# Patient Record
Sex: MALE | Race: WHITE | NOT HISPANIC OR LATINO | Employment: FULL TIME | URBAN - METROPOLITAN AREA
[De-identification: names, ages, dates, MRNs, and addresses within clinical notes are randomized per-mention and may not be internally consistent; named-entity substitution may affect disease eponyms.]

---

## 2018-08-14 ENCOUNTER — HOSPITAL ENCOUNTER (OUTPATIENT)
Facility: CLINIC | Age: 48
Discharge: HOME | End: 2018-08-14
Attending: ORTHOPAEDIC SURGERY | Admitting: ORTHOPAEDIC SURGERY
Payer: OTHER MISCELLANEOUS

## 2018-08-14 ENCOUNTER — ANESTHESIA (OUTPATIENT)
Dept: SURGERY | Facility: CLINIC | Age: 48
End: 2018-08-14
Payer: OTHER MISCELLANEOUS

## 2018-08-14 ENCOUNTER — ANESTHESIA EVENT (OUTPATIENT)
Dept: SURGERY | Facility: CLINIC | Age: 48
End: 2018-08-14
Payer: OTHER MISCELLANEOUS

## 2018-08-14 VITALS
OXYGEN SATURATION: 93 % | HEIGHT: 68 IN | SYSTOLIC BLOOD PRESSURE: 159 MMHG | DIASTOLIC BLOOD PRESSURE: 95 MMHG | WEIGHT: 281.7 LBS | TEMPERATURE: 96.8 F | RESPIRATION RATE: 16 BRPM | BODY MASS INDEX: 42.69 KG/M2

## 2018-08-14 DIAGNOSIS — M75.111 INCOMPLETE TEAR OF RIGHT ROTATOR CUFF: Primary | ICD-10-CM

## 2018-08-14 LAB
CREAT SERPL-MCNC: 0.62 MG/DL (ref 0.66–1.25)
GFR SERPL CREATININE-BSD FRML MDRD: >90 ML/MIN/1.7M2
GLUCOSE BLDC GLUCOMTR-MCNC: 202 MG/DL (ref 70–99)
GLUCOSE BLDC GLUCOMTR-MCNC: 236 MG/DL (ref 70–99)
INTERPRETATION ECG - MUSE: NORMAL
POTASSIUM SERPL-SCNC: 3.9 MMOL/L (ref 3.4–5.3)

## 2018-08-14 PROCEDURE — 71000012 ZZH RECOVERY PHASE 1 LEVEL 1 FIRST HR: Performed by: ORTHOPAEDIC SURGERY

## 2018-08-14 PROCEDURE — C1713 ANCHOR/SCREW BN/BN,TIS/BN: HCPCS | Performed by: ORTHOPAEDIC SURGERY

## 2018-08-14 PROCEDURE — 71000027 ZZH RECOVERY PHASE 2 EACH 15 MINS: Performed by: ORTHOPAEDIC SURGERY

## 2018-08-14 PROCEDURE — 36000093 ZZH SURGERY LEVEL 4 1ST 30 MIN: Performed by: ORTHOPAEDIC SURGERY

## 2018-08-14 PROCEDURE — 82962 GLUCOSE BLOOD TEST: CPT

## 2018-08-14 PROCEDURE — 25000566 ZZH SEVOFLURANE, EA 15 MIN: Performed by: ORTHOPAEDIC SURGERY

## 2018-08-14 PROCEDURE — 25000131 ZZH RX MED GY IP 250 OP 636 PS 637: Performed by: ANESTHESIOLOGY

## 2018-08-14 PROCEDURE — 84132 ASSAY OF SERUM POTASSIUM: CPT | Performed by: ANESTHESIOLOGY

## 2018-08-14 PROCEDURE — 40000171 ZZH STATISTIC PRE-PROCEDURE ASSESSMENT III: Performed by: ORTHOPAEDIC SURGERY

## 2018-08-14 PROCEDURE — 27210794 ZZH OR GENERAL SUPPLY STERILE: Performed by: ORTHOPAEDIC SURGERY

## 2018-08-14 PROCEDURE — 36415 COLL VENOUS BLD VENIPUNCTURE: CPT | Performed by: ANESTHESIOLOGY

## 2018-08-14 PROCEDURE — 25000128 H RX IP 250 OP 636: Performed by: ANESTHESIOLOGY

## 2018-08-14 PROCEDURE — 25800025 ZZH RX 258: Performed by: ORTHOPAEDIC SURGERY

## 2018-08-14 PROCEDURE — 25000128 H RX IP 250 OP 636: Performed by: PHYSICIAN ASSISTANT

## 2018-08-14 PROCEDURE — 37000009 ZZH ANESTHESIA TECHNICAL FEE, EACH ADDTL 15 MIN: Performed by: ORTHOPAEDIC SURGERY

## 2018-08-14 PROCEDURE — 36000063 ZZH SURGERY LEVEL 4 EA 15 ADDTL MIN: Performed by: ORTHOPAEDIC SURGERY

## 2018-08-14 PROCEDURE — 25000128 H RX IP 250 OP 636: Performed by: NURSE ANESTHETIST, CERTIFIED REGISTERED

## 2018-08-14 PROCEDURE — 93010 ELECTROCARDIOGRAM REPORT: CPT | Performed by: INTERNAL MEDICINE

## 2018-08-14 PROCEDURE — 25000128 H RX IP 250 OP 636: Performed by: ORTHOPAEDIC SURGERY

## 2018-08-14 PROCEDURE — 37000008 ZZH ANESTHESIA TECHNICAL FEE, 1ST 30 MIN: Performed by: ORTHOPAEDIC SURGERY

## 2018-08-14 PROCEDURE — 82565 ASSAY OF CREATININE: CPT | Performed by: ANESTHESIOLOGY

## 2018-08-14 PROCEDURE — 25000125 ZZHC RX 250: Performed by: NURSE ANESTHETIST, CERTIFIED REGISTERED

## 2018-08-14 RX ORDER — METOPROLOL TARTRATE 1 MG/ML
1-2 INJECTION, SOLUTION INTRAVENOUS EVERY 5 MIN PRN
Status: DISCONTINUED | OUTPATIENT
Start: 2018-08-14 | End: 2018-08-14 | Stop reason: HOSPADM

## 2018-08-14 RX ORDER — CEFAZOLIN SODIUM 2 G/100ML
2 INJECTION, SOLUTION INTRAVENOUS
Status: DISCONTINUED | OUTPATIENT
Start: 2018-08-14 | End: 2018-08-14 | Stop reason: ALTCHOICE

## 2018-08-14 RX ORDER — ALBUTEROL SULFATE 0.83 MG/ML
2.5 SOLUTION RESPIRATORY (INHALATION) EVERY 4 HOURS PRN
Status: DISCONTINUED | OUTPATIENT
Start: 2018-08-14 | End: 2018-08-14 | Stop reason: HOSPADM

## 2018-08-14 RX ORDER — ONDANSETRON 4 MG/1
4 TABLET, ORALLY DISINTEGRATING ORAL
Status: DISCONTINUED | OUTPATIENT
Start: 2018-08-14 | End: 2018-08-14 | Stop reason: HOSPADM

## 2018-08-14 RX ORDER — ONDANSETRON 4 MG/1
4 TABLET, ORALLY DISINTEGRATING ORAL EVERY 30 MIN PRN
Status: DISCONTINUED | OUTPATIENT
Start: 2018-08-14 | End: 2018-08-14 | Stop reason: HOSPADM

## 2018-08-14 RX ORDER — NALOXONE HYDROCHLORIDE 0.4 MG/ML
.1-.4 INJECTION, SOLUTION INTRAMUSCULAR; INTRAVENOUS; SUBCUTANEOUS
Status: DISCONTINUED | OUTPATIENT
Start: 2018-08-14 | End: 2018-08-14 | Stop reason: HOSPADM

## 2018-08-14 RX ORDER — FENTANYL CITRATE 50 UG/ML
25-50 INJECTION, SOLUTION INTRAMUSCULAR; INTRAVENOUS
Status: DISCONTINUED | OUTPATIENT
Start: 2018-08-14 | End: 2018-08-14 | Stop reason: HOSPADM

## 2018-08-14 RX ORDER — HYDROMORPHONE HYDROCHLORIDE 1 MG/ML
.3-.5 INJECTION, SOLUTION INTRAMUSCULAR; INTRAVENOUS; SUBCUTANEOUS EVERY 10 MIN PRN
Status: DISCONTINUED | OUTPATIENT
Start: 2018-08-14 | End: 2018-08-14 | Stop reason: HOSPADM

## 2018-08-14 RX ORDER — OXYCODONE HYDROCHLORIDE 5 MG/1
5 TABLET ORAL EVERY 4 HOURS PRN
Status: DISCONTINUED | OUTPATIENT
Start: 2018-08-14 | End: 2018-08-14 | Stop reason: HOSPADM

## 2018-08-14 RX ORDER — OXYCODONE HYDROCHLORIDE 5 MG/1
10 TABLET ORAL
Status: DISCONTINUED | OUTPATIENT
Start: 2018-08-14 | End: 2018-08-14 | Stop reason: HOSPADM

## 2018-08-14 RX ORDER — ONDANSETRON 2 MG/ML
4 INJECTION INTRAMUSCULAR; INTRAVENOUS EVERY 30 MIN PRN
Status: DISCONTINUED | OUTPATIENT
Start: 2018-08-14 | End: 2018-08-14 | Stop reason: HOSPADM

## 2018-08-14 RX ORDER — LIDOCAINE HYDROCHLORIDE 10 MG/ML
INJECTION, SOLUTION INFILTRATION; PERINEURAL PRN
Status: DISCONTINUED | OUTPATIENT
Start: 2018-08-14 | End: 2018-08-14

## 2018-08-14 RX ORDER — SODIUM CHLORIDE, SODIUM LACTATE, POTASSIUM CHLORIDE, CALCIUM CHLORIDE 600; 310; 30; 20 MG/100ML; MG/100ML; MG/100ML; MG/100ML
INJECTION, SOLUTION INTRAVENOUS CONTINUOUS
Status: DISCONTINUED | OUTPATIENT
Start: 2018-08-14 | End: 2018-08-14 | Stop reason: HOSPADM

## 2018-08-14 RX ORDER — CELECOXIB 200 MG/1
200 CAPSULE ORAL
Status: DISCONTINUED | OUTPATIENT
Start: 2018-08-14 | End: 2018-08-14 | Stop reason: HOSPADM

## 2018-08-14 RX ORDER — FENTANYL CITRATE 50 UG/ML
INJECTION, SOLUTION INTRAMUSCULAR; INTRAVENOUS PRN
Status: DISCONTINUED | OUTPATIENT
Start: 2018-08-14 | End: 2018-08-14

## 2018-08-14 RX ORDER — NICOTINE POLACRILEX 4 MG
15-30 LOZENGE BUCCAL
Status: DISCONTINUED | OUTPATIENT
Start: 2018-08-14 | End: 2018-08-14 | Stop reason: HOSPADM

## 2018-08-14 RX ORDER — LIDOCAINE 40 MG/G
CREAM TOPICAL
Status: DISCONTINUED | OUTPATIENT
Start: 2018-08-14 | End: 2018-08-14 | Stop reason: HOSPADM

## 2018-08-14 RX ORDER — CEFAZOLIN SODIUM 1 G/3ML
1 INJECTION, POWDER, FOR SOLUTION INTRAMUSCULAR; INTRAVENOUS SEE ADMIN INSTRUCTIONS
Status: DISCONTINUED | OUTPATIENT
Start: 2018-08-14 | End: 2018-08-14 | Stop reason: HOSPADM

## 2018-08-14 RX ORDER — CEFAZOLIN SODIUM 1 G/50ML
3 SOLUTION INTRAVENOUS
Status: DISCONTINUED | OUTPATIENT
Start: 2018-08-14 | End: 2018-08-14

## 2018-08-14 RX ORDER — ONDANSETRON 2 MG/ML
INJECTION INTRAMUSCULAR; INTRAVENOUS PRN
Status: DISCONTINUED | OUTPATIENT
Start: 2018-08-14 | End: 2018-08-14

## 2018-08-14 RX ORDER — HYDROXYZINE HYDROCHLORIDE 25 MG/1
25 TABLET, FILM COATED ORAL
Status: DISCONTINUED | OUTPATIENT
Start: 2018-08-14 | End: 2018-08-14 | Stop reason: HOSPADM

## 2018-08-14 RX ORDER — HYDROXYZINE HYDROCHLORIDE 10 MG/1
10 TABLET, FILM COATED ORAL EVERY 6 HOURS PRN
Qty: 30 TABLET | Refills: 0 | Status: SHIPPED | OUTPATIENT
Start: 2018-08-14 | End: 2022-12-05

## 2018-08-14 RX ORDER — OXYCODONE HYDROCHLORIDE 5 MG/1
5-10 TABLET ORAL
Qty: 30 TABLET | Refills: 0 | Status: SHIPPED | OUTPATIENT
Start: 2018-08-14 | End: 2022-12-05

## 2018-08-14 RX ORDER — PROPOFOL 10 MG/ML
INJECTION, EMULSION INTRAVENOUS PRN
Status: DISCONTINUED | OUTPATIENT
Start: 2018-08-14 | End: 2018-08-14

## 2018-08-14 RX ORDER — HYDRALAZINE HYDROCHLORIDE 20 MG/ML
2.5-5 INJECTION INTRAMUSCULAR; INTRAVENOUS EVERY 10 MIN PRN
Status: DISCONTINUED | OUTPATIENT
Start: 2018-08-14 | End: 2018-08-14 | Stop reason: HOSPADM

## 2018-08-14 RX ORDER — KETOROLAC TROMETHAMINE 30 MG/ML
30 INJECTION, SOLUTION INTRAMUSCULAR; INTRAVENOUS EVERY 6 HOURS PRN
Status: DISCONTINUED | OUTPATIENT
Start: 2018-08-14 | End: 2018-08-14 | Stop reason: HOSPADM

## 2018-08-14 RX ORDER — CEFAZOLIN SODIUM 1 G/50ML
3 SOLUTION INTRAVENOUS
Status: COMPLETED | OUTPATIENT
Start: 2018-08-14 | End: 2018-08-14

## 2018-08-14 RX ORDER — AMOXICILLIN 250 MG
1-2 CAPSULE ORAL 2 TIMES DAILY
Qty: 30 TABLET | Refills: 0 | Status: SHIPPED | OUTPATIENT
Start: 2018-08-14 | End: 2022-12-05

## 2018-08-14 RX ORDER — FENTANYL CITRATE 50 UG/ML
25-50 INJECTION, SOLUTION INTRAMUSCULAR; INTRAVENOUS EVERY 5 MIN PRN
Status: DISCONTINUED | OUTPATIENT
Start: 2018-08-14 | End: 2018-08-14 | Stop reason: HOSPADM

## 2018-08-14 RX ORDER — MEPERIDINE HYDROCHLORIDE 50 MG/ML
12.5 INJECTION INTRAMUSCULAR; INTRAVENOUS; SUBCUTANEOUS
Status: DISCONTINUED | OUTPATIENT
Start: 2018-08-14 | End: 2018-08-14 | Stop reason: HOSPADM

## 2018-08-14 RX ORDER — DEXTROSE MONOHYDRATE 25 G/50ML
25-50 INJECTION, SOLUTION INTRAVENOUS
Status: DISCONTINUED | OUTPATIENT
Start: 2018-08-14 | End: 2018-08-14 | Stop reason: HOSPADM

## 2018-08-14 RX ADMIN — FENTANYL CITRATE 100 MCG: 50 INJECTION, SOLUTION INTRAMUSCULAR; INTRAVENOUS at 10:57

## 2018-08-14 RX ADMIN — SODIUM CHLORIDE, POTASSIUM CHLORIDE, SODIUM LACTATE AND CALCIUM CHLORIDE: 600; 310; 30; 20 INJECTION, SOLUTION INTRAVENOUS at 11:31

## 2018-08-14 RX ADMIN — Medication 3 G: at 10:51

## 2018-08-14 RX ADMIN — PROPOFOL 200 MG: 10 INJECTION, EMULSION INTRAVENOUS at 10:57

## 2018-08-14 RX ADMIN — SODIUM CHLORIDE 2 UNITS: 9 INJECTION, SOLUTION INTRAVENOUS at 10:26

## 2018-08-14 RX ADMIN — HYDRALAZINE HYDROCHLORIDE 10 MG: 20 INJECTION INTRAMUSCULAR; INTRAVENOUS at 14:33

## 2018-08-14 RX ADMIN — CEFAZOLIN 1 G: 1 INJECTION, POWDER, FOR SOLUTION INTRAMUSCULAR; INTRAVENOUS at 12:46

## 2018-08-14 RX ADMIN — SODIUM CHLORIDE, POTASSIUM CHLORIDE, SODIUM LACTATE AND CALCIUM CHLORIDE: 600; 310; 30; 20 INJECTION, SOLUTION INTRAVENOUS at 14:42

## 2018-08-14 RX ADMIN — SODIUM CHLORIDE, POTASSIUM CHLORIDE, SODIUM LACTATE AND CALCIUM CHLORIDE: 600; 310; 30; 20 INJECTION, SOLUTION INTRAVENOUS at 10:51

## 2018-08-14 RX ADMIN — FENTANYL CITRATE 100 MCG: 50 INJECTION, SOLUTION INTRAMUSCULAR; INTRAVENOUS at 12:46

## 2018-08-14 RX ADMIN — ONDANSETRON 4 MG: 2 INJECTION INTRAMUSCULAR; INTRAVENOUS at 13:45

## 2018-08-14 RX ADMIN — ROCURONIUM BROMIDE 50 MG: 10 INJECTION INTRAVENOUS at 10:57

## 2018-08-14 RX ADMIN — SODIUM CHLORIDE 2 UNITS: 9 INJECTION, SOLUTION INTRAVENOUS at 14:39

## 2018-08-14 RX ADMIN — LIDOCAINE HYDROCHLORIDE 50 MG: 10 INJECTION, SOLUTION INFILTRATION; PERINEURAL at 10:57

## 2018-08-14 RX ADMIN — FENTANYL CITRATE 100 MCG: 50 INJECTION, SOLUTION INTRAMUSCULAR; INTRAVENOUS at 14:02

## 2018-08-14 ASSESSMENT — LIFESTYLE VARIABLES: TOBACCO_USE: 1

## 2018-08-14 NOTE — IP AVS SNAPSHOT
[facility]    [address]    [facility] [address]    Phone:  [phone]    Fax:  [phone]                                       After Visit Summary   8/14/2018    Gibran Conner    MRN: [id number]           After Visit Summary Signature Page     I have received my discharge instructions, and my questions have been answered. I have discussed any challenges I see with this plan with the nurse or doctor.    ..........................................................................................................................................  Patient/Patient Representative Signature      ..........................................................................................................................................  Patient Representative Print Name and Relationship to Patient    ..................................................               ................................................  Date                                            Time    ..........................................................................................................................................  Reviewed by Signature/Title    ...................................................              ..............................................  Date                                                            Time

## 2018-08-14 NOTE — ANESTHESIA PROCEDURE NOTES
Peripheral nerve/Neuraxial procedure note : Brachial plexus (supraclavicular block)  Pre-Procedure  Performed by NUVIA LAUREN  Referred by SEAN  Location: pre-op    Procedure Times:8/14/2018 10:38 AM and 8/14/2018 10:44 AM  Pre-Anesthestic Checklist: patient identified, IV checked, site marked, risks and benefits discussed, informed consent, monitors and equipment checked, pre-op evaluation, at physician/surgeon's request and post-op pain management    Timeout  Correct Patient: Yes   Correct Procedure: Yes   Correct Site: Yes   Correct Laterality: Yes   Correct Position: Yes   Site Marked: Yes   .   Procedure Documentation    Diagnosis:RIGHT ROTATOR CUFF TEAR.    Procedure:  right  Brachial plexus (supraclavicular block).     Ultrasound used to identify targeted nerve, plexus, or vascular marker and placed a needle adjacent to it., Ultrasound was used to visualize the spread of the anesthetic in close proximity to the above stated nerve.   Patient Prep;mask, sterile gloves, povidone-iodine 7.5% surgical scrub.  .  Needle: insulated, short bevel Needle Gauge: 21.    Needle Length (Inches) 4  Insertion Method: Single Shot.       Assessment/Narrative  Paresthesias: No.  Injection made incrementally with aspirations every 5 mL..  The placement was negative for: blood aspirated, painful injection and site bleeding.  Bolus given via needle..   Secured via.   Complications: none. Comments:  The surgeon has given a verbal order transferring care of this patient to me for the performance of a regional analgesia block for post-op pain control. It is requested of me because I am uniquely trained and qualified to perform this block and the surgeon is neither trained nor qualified to perform this procedure.    30 ml 0.5% bupivacaine

## 2018-08-14 NOTE — IP AVS SNAPSHOT
MRN:[id number]                      After Visit Summary   8/14/2018    Gibran Conner    MRN: [id number]           Thank you!     Thank you for choosing [facility] for your care. Our goal is always to provide you with excellent care. Hearing back from our patients is one way we can continue to improve our services. Please take a few minutes to complete the written survey that you may receive in the mail after you visit. If you would like to speak to someone directly about your visit please contact Patient Relations at [phone]. Thank you!          Patient Information     Date Of Birth          1970        About your hospital stay     You were admitted on:  August 14, 2018 You last received care in the:  [facility]    You were discharged on:  August 14, 2018       Who to Call     For medical emergencies, please call 911.  For non-urgent questions about your medical care, please call your primary care provider or clinic, [phone]  For questions related to your surgery, please call your surgery clinic        Attending Provider     Provider Specialty    Cuauhtemoc Monroy MD Orthopedics       Primary Care Provider Office Phone # Fax #    Matt Ailabouni [phone]      After Care Instructions      Diet as Tolerated       Return to diet before surgery, unless instructed otherwise.            Discharge Instructions       Review outpatient procedure discharge instructions with patient as directed by Provider            Ice to affected area       Ice pack to surgical site every 15 minutes per hour for 24 hours            Remove dressing - at 48 hours           Return to clinic       Return to clinic 8/23 date, time 1020 am [place]            Wound care       Do not immerse wound in water until sutures removed                  Further instructions from your care team       GENERAL ANESTHESIA OR SEDATION ADULT DISCHARGE INSTRUCTIONS    SPECIAL PRECAUTIONS FOR 24 HOURS AFTER SURGERY    IT IS NOT UNUSUAL TO FEEL LIGHT-HEADED OR FAINT, UP TO 24 HOURS AFTER SURGERY OR WHILE TAKING PAIN MEDICATION.  IF YOU HAVE THESE SYMPTOMS; SIT FOR A FEW MINUTES BEFORE STANDING AND HAVE SOMEONE ASSIST YOU WHEN YOU GET UP TO WALK OR USE THE BATHROOM.    YOU SHOULD REST AND RELAX FOR THE NEXT 24 HOURS AND YOU MUST MAKE ARRANGEMENTS TO HAVE SOMEONE STAY WITH YOU FOR AT LEAST 24 HOURS AFTER YOUR DISCHARGE.  AVOID HAZARDOUS AND STRENUOUS ACTIVITIES.  DO NOT MAKE IMPORTANT DECISIONS FOR 24 HOURS.    DO NOT DRIVE ANY VEHICLE OR OPERATE MECHANICAL EQUIPMENT FOR 24 HOURS FOLLOWING THE END OF YOUR SURGERY.  EVEN THOUGH YOU MAY FEEL NORMAL, YOUR REACTIONS MAY BE AFFECTED BY THE MEDICATION YOU HAVE RECEIVED.    DO NOT DRINK ALCOHOLIC BEVERAGES FOR 24 HOURS FOLLOWING YOUR SURGERY.    DRINK CLEAR LIQUIDS (APPLE JUICE, GINGER ALE, 7-UP, BROTH, ETC.).  PROGRESS TO YOUR REGULAR DIET AS YOU FEEL ABLE.    YOU MAY HAVE A DRY MOUTH, A SORE THROAT, MUSCLES ACHES OR TROUBLE SLEEPING.  THESE SHOULD GO AWAY AFTER 24 HOURS.    CALL YOUR DOCTOR FOR ANY OF THE FOLLOWING:  SIGNS OF INFECTION (FEVER, GROWING TENDERNESS AT THE SURGERY SITE, A LARGE AMOUNT OF DRAINAGE OR BLEEDING, SEVERE PAIN, FOUL-SMELLING DRAINAGE, REDNESS OR SWELLING.    IT HAS BEEN OVER 8 TO 10 HOURS SINCE SURGERY AND YOU ARE STILL NOT ABLE TO URINATE (PASS WATER).       HOME CARE INSTRUCTIONS AFTER REGIONAL ANESTHESIA      To do your surgical procedure, your doctor used regional anesthesia to  numb  your arm, leg, or foot. This type of anesthesia will not be completely worn off for 4-24 hours. You should be careful during this time not to injure your extremity.    As the anesthetic wears off, you may have a tingling and prickly sensation as the feeling starts to return. The amount of discomfort you may feel is unpredictable. Use your pain medication as prescribed or advised by your doctor.    1. Wear a sling until your arm is  "completely  awake .    2. Use crutches until your leg is  awake .    3. Avoid striking or bumping your arm, leg, or foot while it is numb.    4. Avoid extremes of hot or cold while it is numb.    5. Remain quiet and restful the day of surgery. Resume normal activities gradually over the next day or so as advised by your doctor.    6. Do not drive or operate any machinery until your extremity is fully  awake .      Please notify your doctor of any of the followin. There is excessive bleeding or drainage.  2. There is increased swelling of the extremity making the dressing too tight, and this is not relieved by elevating extremity.  3. There is blue coloring to fingers/toes or they are cold to touch.  4. There is severe pain not relieved by your pain medication.  5. There is any numbness or tingling of the extremity the following day.      Pending Results     No orders found from 2018 to 8/15/2018.            Admission Information     Date & Time Provider Department Dept. Phone    2018 Cuauhtemoc Monroy MD [facility] [phone]      Your Vitals Were     Blood Pressure Temperature Respirations Height Weight Pulse Oximetry    155/90 97.1  F (36.2  C) (Temporal) 18 1.727 m (5' 7.99\") 127.8 kg (281 lb 11.2 oz) 92%    BMI (Body Mass Index)                   42.84 kg/m2           [employer] Information     [employer] lets you send messages to your doctor, view your test results, renew your prescriptions, schedule appointments and more. To sign up, go to [email or web address] . Click on \"Log in\" on the left side of the screen, which will take you to the Welcome page. Then click on \"Sign up Now\" on the right side of the page.     You will be asked to enter the access code listed below, as well as some personal information. Please follow the directions to create your username and password.     Your access code is: [email or web address]  Expires: 2018  2:45 PM     Your access code will "  in 90 days. If you need help or a new code, please call your [place] clinic or [phone].        Care EveryWhere ID     This is your Care EveryWhere ID. This could be used by other organizations to access your [place] medical records  [id number]        Equal Access to Services     STACIESTEPHANE CHEKO : Hadii aad ku hadahsanfrancisco j Sogloali, waaxda luqadaha, qaybta kaalmada adeegyada, agata valenzuelarojusto ward. So [facility] [phone].    ATENCIÓN: Si habla español, tiene a wade disposición servicios gratuitos de asistencia lingüística. Bhavna al [phone].    We comply with applicable federal civil rights laws and [place] laws. We do not discriminate on the basis of race, color, national origin, age, disability, sex, sexual orientation, or gender identity.               Review of your medicines      START taking        Dose / Directions    hydrOXYzine 10 MG tablet   Commonly known as:  ATARAX   Used for:  Incomplete tear of right rotator cuff        Dose:  10 mg   Take 1 tablet (10 mg) by mouth every 6 hours as needed for itching (and nausea)   Quantity:  30 tablet   Refills:  0       oxyCODONE IR 5 MG tablet   Commonly known as:  ROXICODONE   Used for:  Incomplete tear of right rotator cuff        Dose:  5-10 mg   Take 1-2 tablets (5-10 mg) by mouth every 3 hours as needed for pain or other (Moderate to Severe)   Quantity:  30 tablet   Refills:  0       senna-docusate 8.6-50 MG per tablet   Commonly known as:  SENOKOT-S;PERICOLACE   Used for:  Incomplete tear of right rotator cuff        Dose:  1-2 tablet   Take 1-2 tablets by mouth 2 times daily Take while on oral narcotics to prevent or treat constipation.   Quantity:  30 tablet   Refills:  0         CONTINUE these medicines which have NOT CHANGED        Dose / Directions    LISINOPRIL PO   Indication:  High Blood Pressure Disorder        Dose:  10 mg   Take 10 mg by mouth daily   Refills:  0       MULTIVITAMIN ADULT PO        Take by mouth daily    Refills:  0       OMEPRAZOLE PO        Dose:  40 mg   Take 40 mg by mouth as needed   Refills:  0            Where to get your medicines      Some of these will need a paper prescription and others can be bought over the counter. Ask your nurse if you have questions.     Bring a paper prescription for each of these medications     hydrOXYzine 10 MG tablet    oxyCODONE IR 5 MG tablet    senna-docusate 8.6-50 MG per tablet                Protect others around you: Learn how to safely use, store and throw away your medicines at www.disposemymeds.org.        Information about OPIOIDS     PRESCRIPTION OPIOIDS: WHAT YOU NEED TO KNOW   We gave you an opioid (narcotic) pain medicine. It is important to manage your pain, but opioids are not always the best choice. You should first try all the other options your care team gave you. Take this medicine for as short a time (and as few doses) as possible.    Some activities can increase your pain, such as bandage changes or therapy sessions. It may help to take your pain medicine 30 to 60 minutes before these activities. Reduce your stress by getting enough sleep, working on hobbies you enjoy and practicing relaxation or meditation. Talk to your care team about ways to manage your pain beyond prescription opioids.    These medicines have risks:    DO NOT drive when on new or higher doses of pain medicine. These medicines can affect your alertness and reaction times, and you could be arrested for driving under the influence (DUI). If you need to use opioids long-term, talk to your care team about driving.    DO NOT operate heavy machinery    DO NOT do any other dangerous activities while taking these medicines.    DO NOT drink any alcohol while taking these medicines.     If the opioid prescribed includes acetaminophen, DO NOT take with any other medicines that contain acetaminophen. Read all labels carefully. Look for the word  acetaminophen  or  Tylenol.  Ask your pharmacist  if you have questions or are unsure.    You can get addicted to pain medicines, especially if you have a history of addiction (chemical, alcohol or substance dependence). Talk to your care team about ways to reduce this risk.    All opioids tend to cause constipation. Drink plenty of water and eat foods that have a lot of fiber, such as fruits, vegetables, prune juice, apple juice and high-fiber cereal. Take a laxative (Miralax, milk of magnesia, Colace, Senna) if you don t move your bowels at least every other day. Other side effects include upset stomach, sleepiness, dizziness, throwing up, tolerance (needing more of the medicine to have the same effect), physical dependence and slowed breathing.    Store your pills in a secure place, locked if possible. We will not replace any lost or stolen medicine. If you don t finish your medicine, please throw away (dispose) as directed by your pharmacist. The [place] Pollution Control Agency has more information about safe disposal: https://www.pca.On license of [facility].mn.us/living-green/managing-unwanted-medications             Medication List: This is a list of all your medications and when to take them. Check marks below indicate your daily home schedule. Keep this list as a reference.      Medications           Morning Afternoon Evening Bedtime As Needed    hydrOXYzine 10 MG tablet   Commonly known as:  ATARAX   Take 1 tablet (10 mg) by mouth every 6 hours as needed for itching (and nausea)                                LISINOPRIL PO   Take 10 mg by mouth daily                                MULTIVITAMIN ADULT PO   Take by mouth daily                                OMEPRAZOLE PO   Take 40 mg by mouth as needed                                oxyCODONE IR 5 MG tablet   Commonly known as:  ROXICODONE   Take 1-2 tablets (5-10 mg) by mouth every 3 hours as needed for pain or other (Moderate to Severe)                                senna-docusate 8.6-50 MG per tablet   Commonly known  as:  SENOKOT-S;PERICOLACE   Take 1-2 tablets by mouth 2 times daily Take while on oral narcotics to prevent or treat constipation.

## 2018-08-14 NOTE — BRIEF OP NOTE
[facility] Brief Operative Note    Pre-operative diagnosis: Rotator cuff tear right shoulder   Post-operative diagnosis same   Procedure: Procedure(s):  Right shoulder arthroscopy, subacrominal decompression, open subpectoral biceps tenodesis and arthroscopic versus open rotator cuff repair - Wound Class: I-Clean   Surgeon(s): Surgeon(s) and Role:     * Cuauhtemoc Monroy MD - Primary     * Oliva Conner PA-C - Assisting   Estimated blood loss: * No values recorded between 8/14/2018 11:31 AM and 8/14/2018  1:42 PM *    Specimens: * No specimens in log *   Findings: See op note

## 2018-08-14 NOTE — ANESTHESIA POSTPROCEDURE EVALUATION
Patient: Gibran Conner    Procedure(s):  Right shoulder arthroscopy, subacrominal decompression, open subpectoral biceps tenodesis and arthroscopic versus open rotator cuff repair - Wound Class: I-Clean    Diagnosis:Rotator cuff tear  Diagnosis Additional Information: Pre-operative diagnosis: Rotator cuff tear right shoulder  Post-operative diagnosis same  Procedure: Procedure(s):  Right shoulder arthroscopy, subacrominal decompression, open subpectoral biceps tenodesis and arthroscopic versus open rotator cuff re, pair         Anesthesia Type:  General, ETT, Peripheral Nerve Block for post-op pain at surgeon's request    Note:  Anesthesia Post Evaluation    Patient location during evaluation: PACU  Patient participation: Able to fully participate in evaluation  Level of consciousness: awake  Pain management: adequate  Airway patency: patent  Cardiovascular status: acceptable  Respiratory status: acceptable  Hydration status: acceptable  PONV: controlled     Anesthetic complications: None          Last vitals:  Vitals:    08/14/18 1450 08/14/18 1455 08/14/18 1500   BP: 135/84 150/70 155/90   Resp: 13 13 18   Temp:      SpO2: 92% 93% 92%         Electronically Signed By: Oz English MD  August 14, 2018  3:12 PM

## 2018-08-14 NOTE — OR NURSING
Blood sugar 202 at 1420. HEMANTH Tineo notified; order received to give 2 units Regular insulin IV.  No re-check needed per HEMANTH Tineo

## 2018-08-14 NOTE — DISCHARGE INSTRUCTIONS
GENERAL ANESTHESIA OR SEDATION ADULT DISCHARGE INSTRUCTIONS   SPECIAL PRECAUTIONS FOR 24 HOURS AFTER SURGERY    IT IS NOT UNUSUAL TO FEEL LIGHT-HEADED OR FAINT, UP TO 24 HOURS AFTER SURGERY OR WHILE TAKING PAIN MEDICATION.  IF YOU HAVE THESE SYMPTOMS; SIT FOR A FEW MINUTES BEFORE STANDING AND HAVE SOMEONE ASSIST YOU WHEN YOU GET UP TO WALK OR USE THE BATHROOM.    YOU SHOULD REST AND RELAX FOR THE NEXT 24 HOURS AND YOU MUST MAKE ARRANGEMENTS TO HAVE SOMEONE STAY WITH YOU FOR AT LEAST 24 HOURS AFTER YOUR DISCHARGE.  AVOID HAZARDOUS AND STRENUOUS ACTIVITIES.  DO NOT MAKE IMPORTANT DECISIONS FOR 24 HOURS.    DO NOT DRIVE ANY VEHICLE OR OPERATE MECHANICAL EQUIPMENT FOR 24 HOURS FOLLOWING THE END OF YOUR SURGERY.  EVEN THOUGH YOU MAY FEEL NORMAL, YOUR REACTIONS MAY BE AFFECTED BY THE MEDICATION YOU HAVE RECEIVED.    DO NOT DRINK ALCOHOLIC BEVERAGES FOR 24 HOURS FOLLOWING YOUR SURGERY.    DRINK CLEAR LIQUIDS (APPLE JUICE, GINGER ALE, 7-UP, BROTH, ETC.).  PROGRESS TO YOUR REGULAR DIET AS YOU FEEL ABLE.    YOU MAY HAVE A DRY MOUTH, A SORE THROAT, MUSCLES ACHES OR TROUBLE SLEEPING.  THESE SHOULD GO AWAY AFTER 24 HOURS.    CALL YOUR DOCTOR FOR ANY OF THE FOLLOWING:  SIGNS OF INFECTION (FEVER, GROWING TENDERNESS AT THE SURGERY SITE, A LARGE AMOUNT OF DRAINAGE OR BLEEDING, SEVERE PAIN, FOUL-SMELLING DRAINAGE, REDNESS OR SWELLING.    IT HAS BEEN OVER 8 TO 10 HOURS SINCE SURGERY AND YOU ARE STILL NOT ABLE TO URINATE (PASS WATER).       HOME CARE INSTRUCTIONS AFTER REGIONAL ANESTHESIA      To do your surgical procedure, your doctor used regional anesthesia to  numb  your arm, leg, or foot. This type of anesthesia will not be completely worn off for 4-24 hours. You should be careful during this time not to injure your extremity.    As the anesthetic wears off, you may have a tingling and prickly sensation as the feeling starts to return. The amount of discomfort you may feel is unpredictable. Use your pain medication as prescribed  or advised by your doctor.    1. Wear a sling until your arm is completely  awake .    2. Use crutches until your leg is  awake .    3. Avoid striking or bumping your arm, leg, or foot while it is numb.    4. Avoid extremes of hot or cold while it is numb.    5. Remain quiet and restful the day of surgery. Resume normal activities gradually over the next day or so as advised by your doctor.    6. Do not drive or operate any machinery until your extremity is fully  awake .      Please notify your doctor of any of the followin. There is excessive bleeding or drainage.  2. There is increased swelling of the extremity making the dressing too tight, and this is not relieved by elevating extremity.  3. There is blue coloring to fingers/toes or they are cold to touch.  4. There is severe pain not relieved by your pain medication.  5. There is any numbness or tingling of the extremity the following day.

## 2018-08-14 NOTE — ANESTHESIA CARE TRANSFER NOTE
Patient: Gibran Conner    Procedure(s):  Right shoulder arthroscopy, subacrominal decompression, open subpectoral biceps tenodesis and arthroscopic versus open rotator cuff repair - Wound Class: I-Clean    Diagnosis: Rotator cuff tear  Diagnosis Additional Information: No value filed.    Anesthesia Type:   General, ETT, Periph. Nerve Block for postop pain     Note:  Airway :Face Mask  Patient transferred to:PACU  Comments: Spont Resps. Follows commands. Extubated. Maintains Resps. Tx to pacu. Report to RNHandoff Report: Identifed the Patient, Identified the Reponsible Provider, Reviewed the pertinent medical history, Discussed the surgical course, Reviewed Intra-OP anesthesia mangement and issues during anesthesia, Set expectations for post-procedure period and Allowed opportunity for questions and acknowledgement of understanding      Vitals: (Last set prior to Anesthesia Care Transfer)    CRNA VITALS  8/14/2018 1336 - 8/14/2018 1411      8/14/2018             Pulse: 67    SpO2: (!)  79 %    Resp Rate (observed): (!)  36                Electronically Signed By: ABI Nguyen CRNA  August 14, 2018  2:11 PM

## 2018-08-14 NOTE — ANESTHESIA PREPROCEDURE EVALUATION
Anesthesia Evaluation     . Pt has had prior anesthetic. Type: General    No history of anesthetic complications          ROS/MED HX    ENT/Pulmonary:     (+)sleep apnea, tobacco use, Past use uses CPAP , . .    Neurologic:       Cardiovascular:     (+) Dyslipidemia, hypertension----. : . . . :. .       METS/Exercise Tolerance:     Hematologic:         Musculoskeletal:         GI/Hepatic:     (+) GERD       Renal/Genitourinary:         Endo:     (+) Obesity, .      Psychiatric:         Infectious Disease:         Malignancy:         Other:                     Physical Exam      Airway   Mallampati: III  TM distance: >3 FB  Neck ROM: full    Dental     Cardiovascular   Rhythm and rate: regular and normal      Pulmonary    breath sounds clear to auscultation                    Anesthesia Plan      History & Physical Review  History and physical reviewed and following examination; no interval change.    ASA Status:  3 .    NPO Status:  > 8 hours    Plan for General, ETT and Periph. Nerve Block for postop pain with Intravenous and Propofol induction. Maintenance will be Balanced.    PONV prophylaxis:  Ondansetron (or other 5HT-3) and Dexamethasone or Solumedrol       Postoperative Care  Postoperative pain management:  IV analgesics, Peripheral nerve block (Single Shot), Multi-modal analgesia, Neuraxial analgesia and Oral pain medications.      Consents  Anesthetic plan, risks, benefits and alternatives discussed with:  Patient..                          .

## 2018-08-15 NOTE — OP NOTE
Procedure Date: 08/14/2018      DATE OF SURGERY: 08/14/2018      PREOPERATIVE DIAGNOSES:   1.  High-grade partial-thickness articular surface subscapularis tendon tear refractory to conservative treatment, left shoulder.   2.  Biceps tendinopathy with subluxation, right shoulder.   3.  Status post previous subacromial decompression, distal clavicle excision.   4.  Subcoracoid impingement.      POSTOPERATIVE DIAGNOSES:     1.  High-grade partial-thickness articular surface subscapularis tendon tear refractory to conservative treatment, left shoulder.    2.  Biceps tendinopathy with subluxation, right shoulder.    3.  Status post previous subacromial decompression, distal clavicle excision.   4.  Subcoracoid impingement.      PROCEDURES PERFORMED:   1.  Right shoulder arthroscopic subcoracoid decompression.   2.  Right shoulder arthroscopic subacromial bursectomy and limited debridement.   3.  Open subscapularis tendon repair, right shoulder.   4.  Open biceps tenodesis, right shoulder.      SURGEON: Cuauhtemoc Monroy MD      FIRST ASSISTANT:  Oliva Conner PA-C      FUNCTION OF ASSISTANT: First assistant was necessary throughout portions of this case in order to assist with patient positioning, retraction, holding of instruments, wound closure, dressing and sling application.      ANESTHESIA:  General with a supraclavicular block.      ESTIMATED BLOOD LOSS:  50 mL.      PREOPERATIVE ANTIBIOTICS:  Ancef 3 grams IV along with 1 gram of tranexamic acid.      COMPLICATIONS:  None noted.      INDICATIONS:  The patient is a 48-year-old gentleman who had a previous subacromial decompression, distal clavicle excision many years ago.  He initially did well, but has developed more recent increasing pain in his right shoulder.  Preoperative imaging including MRI scan indicated a high-grade partial-thickness articular surface subscapularis tendon tear.  The biceps tendon appeared to be subluxed out of the groove and  impinging on the subscap.  He failed conservative treatment and wished to proceed with more definitive surgical treatment, I offered him arthroscopic assessment and treatment pathologies indicated rotator cuff repair and a biceps tenodesis.  The surgery, potential risks, benefits, complications as well as expected postoperative course and recovery were discussed in detail.  Informed consent was obtained.      DESCRIPTION OF PROCEDURE:  The patient was taken to the preoperative area where supraclavicular block was placed in the right upper extremity by the anesthesiologist.  He was then taken to the operating room and general anesthetic was obtained.  He was placed in the left lateral decubitus position on the beanbag with the right arm in 10 pounds longitudinal traction at 30 degrees of abduction and 10 degrees of forward elevation.  The arm was prepped and draped in a sterile fashion.  A timeout was performed confirming the right shoulder as the operative shoulder and that he received his antibiotics along with the tranexamic acid. A standard posterior arthroscopic portal was established and a diagnostic arthroscopy was performed in the glenohumeral joint.  The articular surface of the humeral head and glenoid were intact.  The labrum showed mild degenerative fraying, but was otherwise intact.  The biceps tendon showed some tendinopathy, but was clearly subluxed out of the groove and into the superior aspect of the subscapularis, which showed fairly significant articular surface tearing; I would say involved maybe 90% of the tendon thickness over the course of the superior one-half of the subscapularis.  The supraspinatus and infraspinatus were inspected and found to be intact.  An anterior portal was created in the rotator interval.  The joint was debrided.  The biceps was tenotomized and the stump was debrided.  I debrided the rotator interval and visualized the subscapularis quite nicely as well as the  undersurface of the coracoid.  He had actually an osteophyte developing here and through the cannula, a motorized bur was used to perform a subcoracoid decompression. I then debrided the footprint and used a bur to create a nice bleeding surface in which to perform the repair.  I then performed a subcoracoid decompression.      I then placed the arthroscope in the subacromial space.  He had a pretty thickened bursa consistent with his previous bursectomy.  However, his subacromial space was quite wide.  He had some bursal surface cuff scuffing, but no high-grade tearing.  I just did a bursectomy.  The undersurface of the acromion had what I would describe as more of a type 1 appearance and no subacromial decompression was performed. I then went back into the glenohumeral joint and began attempting a subscapularis repair, placing a 5.5 mm TwinFix anchor through my cannula anteriorly into the lesser tuberosity footprint.  The first anchor actually pulled out and in light of his body habitus, I found it quite difficult to get to a reasonable and safe position to place another anchor.        Therefore, at this point, I elected to proceed with an open repair.  The arthroscope was removed.  My anterior portal was extended distally for a total of 6 cm.  Full-thickness skin flaps were created.  The deltopectoral interval was identified and the cephalic vein was retracted laterally with the deltoid, and the pectoralis medially.  The subdeltoid space was easily identified.  The conjoined tendon was identified and retracted medially.  Bent Charnley retractors were placed underneath the deltoid and over the supraspinatus.  The superolateral aspect of the subscapularis was released, the undersurface debrided, a new anchor was placed in the footprint under direct visualization, and both arms of the 2 sutures were passed in horizontal mattress fashion using the FirstPass suture passing device.  The sutures were then sequentially  tied with a knot pusher.  I then oversewed it laterally with multiple 0 Vicryl suture and closed the superolateral aspect of the interval in a similar fashion.  I then grasped the biceps tendon which was now down at the inferior aspect of the bicipital groove.  It was tagged with an 0 Vicryl suture, pulled up out of my incision, debrided.  I freshened up the bicipital groove from below the lesser tuberosity with the bur to create a nice bleeding bone surface.  I then placed a single 2.8 mm double-loaded Q-Fix All-Suture anchor just below the lesser tuberosity.  Two sutures were then passed in a [place] type fashion after tensioning the biceps to the appropriate level.  The sutures were then sequentially tied with a knot pusher. I then incorporated the more proximal biceps into the groove by oversewing it with 0 Vicryl suture.  The remaining excess was amputated.  I irrigated with saline and then closed the deltopectoral interval with interrupted 0 Vicryl suture.  The skin was closed with 2-0 Vicryl and 3-0 Vicryl, and 3-0 Vicryl in the portals.  Steri-Strips were applied, followed by compressive dressing and an UltraSling.  He was then awoken and transferred to the recovery room in stable condition.      The postoperative plan will be to discharge him to home.  He can begin active assisted range of motion of the elbow and wrist and only pendulum to the shoulder.         CHIDI ESTRADA MD             D: 2018   T: 2018   MT:       Name:     KIRSTEN JOHNSON   MRN:      [id number]        Account:        [id number]   :      1970           Procedure Date: 2018      Document: [id number]       cc: Matt Brownlee MD

## 2022-03-10 ENCOUNTER — ANCILLARY PROCEDURE (OUTPATIENT)
Dept: GENERAL RADIOLOGY | Facility: CLINIC | Age: 52
End: 2022-03-10
Attending: STUDENT IN AN ORGANIZED HEALTH CARE EDUCATION/TRAINING PROGRAM
Payer: COMMERCIAL

## 2022-03-10 ENCOUNTER — OFFICE VISIT (OUTPATIENT)
Dept: ORTHOPEDICS | Facility: CLINIC | Age: 52
End: 2022-03-10
Payer: COMMERCIAL

## 2022-03-10 VITALS
HEIGHT: 69 IN | SYSTOLIC BLOOD PRESSURE: 136 MMHG | DIASTOLIC BLOOD PRESSURE: 84 MMHG | BODY MASS INDEX: 41.47 KG/M2 | WEIGHT: 280 LBS

## 2022-03-10 DIAGNOSIS — M17.11 PRIMARY OSTEOARTHRITIS OF RIGHT KNEE: ICD-10-CM

## 2022-03-10 DIAGNOSIS — M25.561 CHRONIC PAIN OF RIGHT KNEE: Primary | ICD-10-CM

## 2022-03-10 DIAGNOSIS — Z98.890 HISTORY OF MEDIAL MENISCUS REPAIR OF RIGHT KNEE: ICD-10-CM

## 2022-03-10 DIAGNOSIS — M25.561 CHRONIC PAIN OF RIGHT KNEE: ICD-10-CM

## 2022-03-10 DIAGNOSIS — G89.29 CHRONIC PAIN OF RIGHT KNEE: ICD-10-CM

## 2022-03-10 DIAGNOSIS — G89.29 CHRONIC PAIN OF RIGHT KNEE: Primary | ICD-10-CM

## 2022-03-10 PROCEDURE — 20610 DRAIN/INJ JOINT/BURSA W/O US: CPT | Mod: RT | Performed by: STUDENT IN AN ORGANIZED HEALTH CARE EDUCATION/TRAINING PROGRAM

## 2022-03-10 PROCEDURE — 99203 OFFICE O/P NEW LOW 30 MIN: CPT | Mod: 25 | Performed by: STUDENT IN AN ORGANIZED HEALTH CARE EDUCATION/TRAINING PROGRAM

## 2022-03-10 PROCEDURE — 73562 X-RAY EXAM OF KNEE 3: CPT | Performed by: RADIOLOGY

## 2022-03-10 RX ORDER — LIDOCAINE HYDROCHLORIDE 10 MG/ML
4 INJECTION, SOLUTION INFILTRATION; PERINEURAL
Status: DISCONTINUED | OUTPATIENT
Start: 2022-03-10 | End: 2022-04-15

## 2022-03-10 RX ORDER — TRIAMCINOLONE ACETONIDE 40 MG/ML
40 INJECTION, SUSPENSION INTRA-ARTICULAR; INTRAMUSCULAR
Status: DISCONTINUED | OUTPATIENT
Start: 2022-03-10 | End: 2022-04-15

## 2022-03-10 RX ORDER — ASPIRIN 81 MG/1
81 TABLET, CHEWABLE ORAL EVERY MORNING
Status: ON HOLD | COMMUNITY
End: 2022-12-21

## 2022-03-10 RX ADMIN — LIDOCAINE HYDROCHLORIDE 4 ML: 10 INJECTION, SOLUTION INFILTRATION; PERINEURAL at 11:05

## 2022-03-10 RX ADMIN — TRIAMCINOLONE ACETONIDE 40 MG: 40 INJECTION, SUSPENSION INTRA-ARTICULAR; INTRAMUSCULAR at 11:05

## 2022-03-10 NOTE — LETTER
3/10/2022         RE: Gibran Conner  [address] [address]        Dear Colleague,    Thank you for referring your patient, Gibran Conner, to the [facility] [place]. Please see a copy of my visit note below.    ASSESSMENT & PLAN    1. Chronic pain of right knee    2. Primary osteoarthritis of right knee    3. History of medial meniscectomy      Gibran Conner is a 51 year old male presenting for evaluation of chronic right knee pain.  History, exam and XR findings were reviewed today, consistent with moderate medial compartment knee osteoarthritis in the setting of distant medical meniscus tear s/p medial meniscectomy many years ago. We reviewed treatment options inclusive of pain control, activity modification, injections and formal physical therapy. Also reviewed timing of advance imaging (ie MRI).      Plan:  - A right knee cortisone injection was performed in clinic today without complication. Post-injection instructions provided below.  - Heat or ice as needed for pain.  - Compression as needed for comfort.  - Please reach out to us if you decide you'd like to go ahead with a physical therapy referral.    Return to clinic as needed for persistence or worsening of pain.    You may call our direct clinic number ([phone]) at any time with questions or concerns.    Jordana Eden MD, [facility]    -----    SUBJECTIVE  Gibran Conner is a/an 51 year old male who is seen as a self referral for evaluation of right knee pain. The patient is seen by themselves.    Onset: 1 years(s) ago. Reports insidious onset without acute precipitating event.  Location of Pain: right medial and posterior knee  Rating of Pain at worst: 9/10  Rating of Pain Currently: 4/10  Worsened by: walking  Better with: rest / activity avoidance, ice  Treatments tried: rest/activity avoidance  Associated symptoms: warmth, intermittent  "swelling, some catching in the front/medial aspect of the knee, occasional locking while in bed. No locking or instability while weight bearing.   Orthopedic history: NO  Relevant surgical history: YES - right knee meniscectomy Date: 17-20 years ago  Social history: Works at [employer] -     Past Medical History:   Diagnosis Date     Diabetes (H) 2018    new DX     Hypertension      Sleep apnea     CPAP     Social History     Socioeconomic History     Marital status: Single     Spouse name: Not on file     Number of children: Not on file     Years of education: Not on file     Highest education level: Not on file   Occupational History     Not on file   Tobacco Use     Smoking status: Former Smoker     Packs/day: 1.00     Years: 30.00     Pack years: 30.00     Types: Cigarettes     Quit date:      Years since quittin.1     Smokeless tobacco: Never Used   Substance and Sexual Activity     Alcohol use: Yes     Comment: daily 3-4     Drug use: No     Sexual activity: Not on file   Other Topics Concern     Not on file   Social History Narrative     Not on file     Social Determinants of Health     Financial Resource Strain: Not on file   Food Insecurity: Not on file   Transportation Needs: Not on file   Physical Activity: Not on file   Stress: Not on file   Social Connections: Not on file   Intimate Partner Violence: Not on file   Housing Stability: Not on file         Patient's past medical, surgical, social, and family histories were reviewed today and no changes are noted.    REVIEW OF SYSTEMS:  10 point ROS is negative other than symptoms noted above in HPI, Past Medical History or as stated below  Constitutional: NEGATIVE for fever, chills, change in weight  Skin: NEGATIVE for worrisome rashes, moles or lesions  GI/: NEGATIVE for bowel or bladder changes  Neuro: NEGATIVE for weakness, dizziness or paresthesias    OBJECTIVE:  /84   Ht 1.753 m (5' 9\")   Wt " 127 kg (280 lb)   BMI 41.35 kg/m     General: healthy, alert and in no distress  HEENT: no scleral icterus or conjunctival erythema  Skin: no suspicious lesions or rash. No jaundice.  CV: no pedal edema  Resp: normal respiratory effort without conversational dyspnea   Psych: normal mood and affect  Gait: normal steady gait with appropriate coordination and balance  Neuro: Normal light sensory exam of lower extremity  MSK:  RIGHT KNEE  Inspection:    normal alignment, previous arthroscopy scars noted  Palpation:    Tender about the medial patellar facet and medial joint line. Remainder of bony and ligamentous landmarks are nontender.    Small effusion is present    Patellofemoral crepitus is Present  Range of Motion:     00 extension to 1200 flexion  Strength:    Quadriceps 5-/5 and hamstrings 5-/5    Extensor mechanism intact  Special Tests:    Positive: mild patellar inhibition    Negative: patellar apprehension, MCL/valgus stress (0 & 30 deg), LCL/varus stress (0 & 30 deg), Lachman's, anterior drawer, posterior drawer, aSnam's    Independent visualization of the below image:  Recent Results (from the past 24 hour(s))   XR Knee Standing AP Bilat Ider Bilat Lat Right    Narrative    KNEE STANDING AP BILATERAL SUNRISE BILATERAL LATERAL RIGHT   3/10/2022  11:00 AM     HISTORY: Chronic pain of right knee.    COMPARISON: None.      Impression    IMPRESSION:    Right: Moderate osteoarthrosis in the medial compartment. Mild  osteoarthrosis in the patellofemoral compartment. Trace effusion. No  evidence of fracture or calcified intra-articular body.    Left: Negative.       Large Joint Injection/Arthocentesis: R knee joint    Date/Time: 3/10/2022 11:05 AM  Performed by: Jordana Woody MD  Authorized by: Jordana Woody MD     Indications:  Pain and osteoarthritis  Needle Size:  25 G  Guidance: landmark guided    Approach:  Anterolateral  Location:  Knee      Medications:  40 mg triamcinolone 40  MG/ML; 4 mL lidocaine 1 %  Outcome:  Tolerated well, no immediate complications  Procedure discussed: discussed risks, benefits, and alternatives    Consent Given by:  Patient  Timeout: timeout called immediately prior to procedure    Prep: patient was prepped and draped in usual sterile fashion      Patient rated his pain 4/10 pre-injection and 0/10 post-injection.      Jordana Eden MD, [facility]        Again, thank you for allowing me to participate in the care of your patient.        Sincerely,        Jordana Eden MD

## 2022-03-10 NOTE — PROGRESS NOTES
ASSESSMENT & PLAN    1. Chronic pain of right knee    2. Primary osteoarthritis of right knee    3. History of medial meniscectomy      Gibran Conner is a 51 year old male presenting for evaluation of chronic right knee pain.  History, exam and XR findings were reviewed today, consistent with moderate medial compartment knee osteoarthritis in the setting of distant medical meniscus tear s/p medial meniscectomy many years ago. We reviewed treatment options inclusive of pain control, activity modification, injections and formal physical therapy. Also reviewed timing of advance imaging (ie MRI).      Plan:  - A right knee cortisone injection was performed in clinic today without complication. Post-injection instructions provided below.  - Heat or ice as needed for pain.  - Compression as needed for comfort.  - Please reach out to us if you decide you'd like to go ahead with a physical therapy referral.    Return to clinic as needed for persistence or worsening of pain.    You may call our direct clinic number ([phone]) at any time with questions or concerns.    Jordana Eden MD, [facility]    -----    SUBJECTIVE  Gibran Conner is a/an 51 year old male who is seen as a self referral for evaluation of right knee pain. The patient is seen by themselves.    Onset: 1 years(s) ago. Reports insidious onset without acute precipitating event.  Location of Pain: right medial and posterior knee  Rating of Pain at worst: 9/10  Rating of Pain Currently: 4/10  Worsened by: walking  Better with: rest / activity avoidance, ice  Treatments tried: rest/activity avoidance  Associated symptoms: warmth, intermittent swelling, some catching in the front/medial aspect of the knee, occasional locking while in bed. No locking or instability while weight bearing.   Orthopedic history: NO  Relevant surgical history: YES - right knee meniscectomy Date: 17-20 years ago  Social history:  "Works at [employer] -     Past Medical History:   Diagnosis Date     Diabetes (H) 2018    new DX     Hypertension      Sleep apnea     CPAP     Social History     Socioeconomic History     Marital status: Single     Spouse name: Not on file     Number of children: Not on file     Years of education: Not on file     Highest education level: Not on file   Occupational History     Not on file   Tobacco Use     Smoking status: Former Smoker     Packs/day: 1.00     Years: 30.00     Pack years: 30.00     Types: Cigarettes     Quit date:      Years since quittin.1     Smokeless tobacco: Never Used   Substance and Sexual Activity     Alcohol use: Yes     Comment: daily 3-4     Drug use: No     Sexual activity: Not on file   Other Topics Concern     Not on file   Social History Narrative     Not on file     Social Determinants of Health     Financial Resource Strain: Not on file   Food Insecurity: Not on file   Transportation Needs: Not on file   Physical Activity: Not on file   Stress: Not on file   Social Connections: Not on file   Intimate Partner Violence: Not on file   Housing Stability: Not on file         Patient's past medical, surgical, social, and family histories were reviewed today and no changes are noted.    REVIEW OF SYSTEMS:  10 point ROS is negative other than symptoms noted above in HPI, Past Medical History or as stated below  Constitutional: NEGATIVE for fever, chills, change in weight  Skin: NEGATIVE for worrisome rashes, moles or lesions  GI/: NEGATIVE for bowel or bladder changes  Neuro: NEGATIVE for weakness, dizziness or paresthesias    OBJECTIVE:  /84   Ht 1.753 m (5' 9\")   Wt 127 kg (280 lb)   BMI 41.35 kg/m     General: healthy, alert and in no distress  HEENT: no scleral icterus or conjunctival erythema  Skin: no suspicious lesions or rash. No jaundice.  CV: no pedal edema  Resp: normal respiratory effort without conversational dyspnea "   Psych: normal mood and affect  Gait: normal steady gait with appropriate coordination and balance  Neuro: Normal light sensory exam of lower extremity  MSK:  RIGHT KNEE  Inspection:    normal alignment, previous arthroscopy scars noted  Palpation:    Tender about the medial patellar facet and medial joint line. Remainder of bony and ligamentous landmarks are nontender.    Small effusion is present    Patellofemoral crepitus is Present  Range of Motion:     00 extension to 1200 flexion  Strength:    Quadriceps 5-/5 and hamstrings 5-/5    Extensor mechanism intact  Special Tests:    Positive: mild patellar inhibition    Negative: patellar apprehension, MCL/valgus stress (0 & 30 deg), LCL/varus stress (0 & 30 deg), Lachman's, anterior drawer, posterior drawer, Sanam's    Independent visualization of the below image:  Recent Results (from the past 24 hour(s))   XR Knee Standing AP Bilat [place] Bilat Lat Right    Narrative    KNEE STANDING AP BILATERAL SUNRISE BILATERAL LATERAL RIGHT   3/10/2022  11:00 AM     HISTORY: Chronic pain of right knee.    COMPARISON: None.      Impression    IMPRESSION:    Right: Moderate osteoarthrosis in the medial compartment. Mild  osteoarthrosis in the patellofemoral compartment. Trace effusion. No  evidence of fracture or calcified intra-articular body.    Left: Negative.       Large Joint Injection/Arthocentesis: R knee joint    Date/Time: 3/10/2022 11:05 AM  Performed by: Jordana Woody MD  Authorized by: Jordana Woody MD     Indications:  Pain and osteoarthritis  Needle Size:  25 G  Guidance: landmark guided    Approach:  Anterolateral  Location:  Knee      Medications:  40 mg triamcinolone 40 MG/ML; 4 mL lidocaine 1 %  Outcome:  Tolerated well, no immediate complications  Procedure discussed: discussed risks, benefits, and alternatives    Consent Given by:  Patient  Timeout: timeout called immediately prior to procedure    Prep: patient was prepped and  draped in usual sterile fashion      Patient rated his pain 4/10 pre-injection and 0/10 post-injection.      Jordana Eden MD, [facility]

## 2022-03-10 NOTE — PATIENT INSTRUCTIONS
1. Chronic pain of right knee    2. Primary osteoarthritis of right knee    3. History of medial meniscectomy      Gibran Conner is a 51 year old male presenting for evaluation of chronic right knee pain.  History, exam and XR findings were reviewed today, consistent with moderate medial compartment knee osteoarthritis in the setting of distant medical meniscus tear s/p medial meniscectomy many years ago. We reviewed treatment options inclusive of pain control, activity modification, injections and formal physical therapy. Also reviewed timing of advance imaging (ie MRI).      Plan:  - A right knee cortisone injection was performed in clinic today without complication. Post-injection instructions provided below.  - Heat or ice as needed for pain.  - Compression as needed for comfort.  - Please reach out to us if you decide you'd like to go ahead with a physical therapy referral.    Return to clinic as needed for persistence or worsening of pain.    You may call our direct clinic number ([phone]) at any time with questions or concerns.    Jordana Eden MD, [facility]    JOINT INJECTION AFTERCARE    After any kind of joint injection, it is not uncommon to experience:  - Soreness, swelling or bruising around the injection site.  - Mild numbness, tingling or weakness around the injection site caused by the numbing medicine used before or with the injection.     It is also possible to experience the following effects associated with the specific agent after injection:  - Allergic reaction.  - Increased blood sugar levels for 10-14 days following cortisone injection. If you have diabetes and notice that your blood sugar levels have increased, notify your primary care physician.  - Increased blood pressure levels.  - Mood swings.  - Increased fluid accumulation in the injected joint.    These effects all should resolve within a day or two of your procedure.    Please  note that it may take up to 14 days for the steroid (cortisone) medication to start working.     HOME CARE INSTRUCTIONS    - Limit yourself to light activity activity on the day of your procedure. Avoid lifting heavy objects, bending, stooping or twisting.   - You may shower, but please avoid swimming, hot tubs or tub baths for 24 hours following the procedure.   - Take over-the-counter pain medication (NSAIDS or Tylenol) for pain control after your procedure as needed.    - You may use ice packs for 10-15 minutes 3-4 times per day at the injection site to reduce pain and swelling after your procedure.   + Put ice in a plastic bag  + Place a towel between your skin and the ice bag  + Leave the ice on for no longer than 20 minutes each time to avoid injuring your skin or nerves  + This can be repeated 3-4 times per day for a few days after the injection    SEEK IMMEDIATE MEDICAL CARE IF:    - Pain and swelling get worse rather than better or extend beyond the injection site  - Numbness does not go away after a few hours  - Blood or fluid continues to leak from the injection site  - You experience chest pain  - You have swelling of your face or tongue  - You have trouble breathing or become dizzy  - You develop fever, chills or severe tenderness at the injection site that lasts longer than 1 day    MAKE SURE YOU:    - Understand these instructions  - Watch your condition  - Get help right away if you are not doing well or if you get worse

## 2022-03-28 ENCOUNTER — TELEPHONE (OUTPATIENT)
Dept: ORTHOPEDICS | Facility: CLINIC | Age: 52
End: 2022-03-28
Payer: COMMERCIAL

## 2022-03-28 DIAGNOSIS — M17.11 PRIMARY OSTEOARTHRITIS OF RIGHT KNEE: Primary | ICD-10-CM

## 2022-03-28 NOTE — TELEPHONE ENCOUNTER
Reason for call:  Received a Cortizone shot on march 10 th in his right knee. The pain has returned and he would like to discuss what he needs to do next.    Phone number to reach patient:  Home number on file [phone] (home)    Best Time:  any    Can we leave a detailed message on this number?  NO

## 2022-03-29 NOTE — TELEPHONE ENCOUNTER
Phone call to patient.   He states he had about 1 week of relief after the cortisone injection of his right knee on 3/10/22. The pain then gradually returned. However, he is no longer having the feeling of it catching anymore.   Pain is located medially inside the knee cap and the back of his knee feels tight when he wakes up. The lateral side of his knee feels hot without redness. She states pain is worse after being on his feet all day. The pain is also waking him up in the night.   He would like to know next steps.     Will discuss with provider and get back with him.   Ok to leave message : YES    Please advise next steps.     MARTIN Reddy RN

## 2022-03-30 NOTE — CONFIDENTIAL NOTE
Chart reviewed. I am glad that the catching has resolved! As a next step, I would recommend starting physical therapy and trying a hyaluronic acid (gel) injection. We can submit the gel injection for insurance approval, which typically takes a couple of weeks, and have him schedule the procedure visit for 2-3 weeks from now. Please reach out to relay this option and let us know if he would like to go ahead with the gel injection and/or physical therapy.     Jordana Eden MD, CAQSM  [facility] and Orthopedic [facility]

## 2022-03-30 NOTE — TELEPHONE ENCOUNTER
Return call to patient. Spoke with patient and informed him of Dr. Ochoa's recommendations. He would like to proceed with the gel injection at this time. He is not interested in PT. Appt scheduled. He verbalized understanding and was appreciative of call back.    Patient scheduled for appointment on 4/15/22 for discussion of viscosupplementation injection vs steroid injection of right knee.      Steroid  injection last completed 3/10/22.       Prior authorization referral for SynviscOne injection pended.     Tania Guzman MBA, ATC

## 2022-04-05 ENCOUNTER — TELEPHONE (OUTPATIENT)
Dept: ORTHOPEDICS | Facility: CLINIC | Age: 52
End: 2022-04-05
Payer: COMMERCIAL

## 2022-04-05 NOTE — TELEPHONE ENCOUNTER
Patient calls stating he has an appointment scheduled with Dr. Ochoa on 4/15/22 for a right knee visco injection. He had to wait for approval from insurance.     Per chart review, the visco injection has been authorized.     He is calling because he missed work on 4/4/22 due to right knee pain. He called in sick because he was up all night in pain. He thinks the weather had a lot to do with it as well and that he overdid it.   His employer is asking for a doctor's note for missed work and any restrictions going forward.   Patient is not expecting any restrictions and went back to work today. He plans to continue working until his follow up appointment on 4/15/22.     Please fax letter to attn : [phone]    His last visit was 3/10/22. Will discuss with provider and get back with him tomorrow when she returns to the office.     Please see letter pending and advise.     MARTIN Reddy RN

## 2022-04-05 NOTE — LETTER
April 5, 2022      Gibran Conner  [address] [address]        To Whom It May Concern,      Gibran Conner is a patient under my care for right knee pain.   Please excuse him from work on 4/4/22.     Sincerely,        Jordana Eden MD

## 2022-04-06 NOTE — CONFIDENTIAL NOTE
Work letter has been printed and signed. Ready to be faxed to employer.    Jordana Eden MD, CAQSM  [facility]

## 2022-04-15 ENCOUNTER — OFFICE VISIT (OUTPATIENT)
Dept: ORTHOPEDICS | Facility: CLINIC | Age: 52
End: 2022-04-15
Payer: COMMERCIAL

## 2022-04-15 VITALS
WEIGHT: 280 LBS | HEIGHT: 69 IN | SYSTOLIC BLOOD PRESSURE: 138 MMHG | DIASTOLIC BLOOD PRESSURE: 88 MMHG | BODY MASS INDEX: 41.47 KG/M2

## 2022-04-15 DIAGNOSIS — G89.29 CHRONIC PAIN OF RIGHT KNEE: Primary | ICD-10-CM

## 2022-04-15 DIAGNOSIS — M25.561 CHRONIC PAIN OF RIGHT KNEE: Primary | ICD-10-CM

## 2022-04-15 DIAGNOSIS — M17.11 PRIMARY OSTEOARTHRITIS OF RIGHT KNEE: ICD-10-CM

## 2022-04-15 PROCEDURE — 99212 OFFICE O/P EST SF 10 MIN: CPT | Mod: 25 | Performed by: STUDENT IN AN ORGANIZED HEALTH CARE EDUCATION/TRAINING PROGRAM

## 2022-04-15 PROCEDURE — 20611 DRAIN/INJ JOINT/BURSA W/US: CPT | Mod: RT | Performed by: STUDENT IN AN ORGANIZED HEALTH CARE EDUCATION/TRAINING PROGRAM

## 2022-04-15 RX ORDER — LIDOCAINE HYDROCHLORIDE 10 MG/ML
4 INJECTION, SOLUTION INFILTRATION; PERINEURAL
Status: DISCONTINUED | OUTPATIENT
Start: 2022-04-15 | End: 2022-06-29

## 2022-04-15 RX ADMIN — LIDOCAINE HYDROCHLORIDE 4 ML: 10 INJECTION, SOLUTION INFILTRATION; PERINEURAL at 10:29

## 2022-04-15 NOTE — LETTER
4/15/2022         RE: Gibran Conner  [address] [address]        Dear Colleague,    Thank you for referring your patient, Gibran Conner, to the [facility] [place]. Please see a copy of my visit note below.    ASSESSMENT & PLAN    1. Chronic pain of right knee    2. Primary osteoarthritis of right knee      An ultrasound-guided right knee intra-articular hyaluronic acid injection was performed today without complication. Ultrasound was use to ensure safe and accurate needle placement within the joint.  Pain was improved following the procedure.     Post-injection instructions:    You may shower, but please avoid swimming, tub baths or hot tubs for 24 hours following your procedure.    You may have a mild to moderate increase in pain for several days following the injection.    I recommend using compression (ie an ACE bandage) and applying ice to the knee for 10-15 minutes 3-4 times per day for the first few days following the injection.     I recommend taking Ibuprofen 600 mg (3 tabs) with food every 6 hours for the next few days, then return to as-needed. You may also use Tylenol for pain control if necessary.    Take it easy for the first week or two following the injection, then gradually return to exercise and physical therapy as tolerated based on pain.    It may take up to 6 weeks for the hyaluronic acid injection to start working although you may feel the effect as early as a few days after the procedure.      If you experience any of the following, call [facility] @ [phone]  -Fever over 100 degree F  -Swelling, bleeding, redness, drainage, warmth at the injection site  -New or worsening pain    Please schedule a follow up appointment to see me in 6-8 weeks if your pain has not improved. You may call our direct clinic number ([phone]) at any time with questions or concerns.    Jordana Eden MD, [facility]  "Sports and Orthopedic Care    -----    SUBJECTIVE:  Gibran Conner is a 51 year old male who is seen in follow-up for right knee pain. They were last seen 3/10/22 and had right knee cortisone injection. Patient reports good relief lasting only ~ 1 week. Pain has gradually returned.     Since their last visit reports worsening pain throughout the day. They indicate that their current pain level is 2/10. They have tried rest/activity avoidance and ibuprofen PRN.      The patient is seen by themselves.    Patient's past medical, surgical, social, and family histories were reviewed today and no changes are noted.    REVIEW OF SYSTEMS:  Constitutional: NEGATIVE for fever, chills, change in weight  Skin: NEGATIVE for worrisome rashes, moles or lesions  GI/: NEGATIVE for bowel or bladder changes  Neuro: NEGATIVE for weakness, dizziness or paresthesias    OBJECTIVE:  /88   Ht 1.753 m (5' 9\")   Wt 127 kg (280 lb)   BMI 41.35 kg/m     General: healthy, alert and in no distress  HEENT: no scleral icterus or conjunctival erythema  Skin: no suspicious lesions or rash. No jaundice.  CV: regular rhythm by palpation, no pedal edema  Resp: normal respiratory effort without conversational dyspnea   Psych: normal mood and affect  Gait: normal steady gait with appropriate coordination and balance  Neuro: normal light touch sensory exam of the extremities.    MSK:  RIGHT KNEE  Inspection:    normal alignment, previous arthroscopy scars noted  Palpation:    Tender about the medial patellar facet and medial joint line. Remainder of bony and ligamentous landmarks are nontender.    Small effusion is present    Patellofemoral crepitus is Present  Range of Motion:     00 extension to 1200 flexion    Independent visualization of the below image:  Results for orders placed or performed in visit on 03/10/22   XR Knee Standing AP Bilat [place] Bilat Lat Right    Narrative    KNEE STANDING AP BILATERAL [place] BILATERAL LATERAL RIGHT   " 3/10/2022  11:00 AM     HISTORY: Chronic pain of right knee.    COMPARISON: None.      Impression    IMPRESSION:    Right: Moderate osteoarthrosis in the medial compartment. Mild  osteoarthrosis in the patellofemoral compartment. Trace effusion. No  evidence of fracture or calcified intra-articular body.    Left: Negative.    TOMI CORRALES MD         SYSTEM ID:  [id number]     Large Joint Injection/Arthocentesis: R knee joint    Date/Time: 4/15/2022 10:29 AM  Performed by: Jordana Woody MD  Authorized by: Jordana oWody MD     Indications:  Pain and osteoarthritis  Needle Size:  22 G  Guidance: ultrasound    Approach:  Anterolateral  Location:  Knee      Medications:  4 mL lidocaine 1 %; 48 mg hylan 48 MG/6ML  Outcome:  Tolerated well, no immediate complications  Procedure discussed: discussed risks, benefits, and alternatives    Consent Given by:  Patient  Timeout: timeout called immediately prior to procedure    Prep: patient was prepped and draped in usual sterile fashion     Ultrasound was used to ensure safe and accurate needle placement and injection. Ultrasound images of the procedure were permanently stored.      Patient reported improvement in pain following the injection.    Jordana Eden MD, [facility] and Orthopedic Care              Again, thank you for allowing me to participate in the care of your patient.        Sincerely,        Jordana Eden MD

## 2022-04-15 NOTE — PATIENT INSTRUCTIONS
1. Chronic pain of right knee    2. Primary osteoarthritis of right knee      An ultrasound-guided right knee intra-articular hyaluronic acid injection was performed today without complication. Ultrasound was use to ensure safe and accurate needle placement within the joint.  Pain was improved following the procedure.     Post-injection instructions:  You may shower, but please avoid swimming, tub baths or hot tubs for 24 hours following your procedure.  You may have a mild to moderate increase in pain for several days following the injection.  I recommend using compression (ie an ACE bandage) and applying ice to the knee for 10-15 minutes 3-4 times per day for the first few days following the injection.   I recommend taking Ibuprofen 600 mg (3 tabs) with food every 6 hours for the next few days, then return to as-needed. You may also use Tylenol for pain control if necessary.  Take it easy for the first week or two following the injection, then gradually return to exercise and physical therapy as tolerated based on pain.  It may take up to 6 weeks for the hyaluronic acid injection to start working although you may feel the effect as early as a few days after the procedure.    If you experience any of the following, call [facility] @ [phone]  -Fever over 100 degree F  -Swelling, bleeding, redness, drainage, warmth at the injection site  -New or worsening pain    Please schedule a follow up appointment to see me in 6-8 weeks if your pain has not improved. You may call our direct clinic number ([phone]) at any time with questions or concerns.    Jordana Eden MD, CAM  [facility] and Orthopedic Care

## 2022-04-15 NOTE — PROGRESS NOTES
ASSESSMENT & PLAN    1. Chronic pain of right knee    2. Primary osteoarthritis of right knee      An ultrasound-guided right knee intra-articular hyaluronic acid injection was performed today without complication. Ultrasound was use to ensure safe and accurate needle placement within the joint.  Pain was improved following the procedure.     Post-injection instructions:    You may shower, but please avoid swimming, tub baths or hot tubs for 24 hours following your procedure.    You may have a mild to moderate increase in pain for several days following the injection.    I recommend using compression (ie an ACE bandage) and applying ice to the knee for 10-15 minutes 3-4 times per day for the first few days following the injection.     I recommend taking Ibuprofen 600 mg (3 tabs) with food every 6 hours for the next few days, then return to as-needed. You may also use Tylenol for pain control if necessary.    Take it easy for the first week or two following the injection, then gradually return to exercise and physical therapy as tolerated based on pain.    It may take up to 6 weeks for the hyaluronic acid injection to start working although you may feel the effect as early as a few days after the procedure.      If you experience any of the following, call [facility] @ [phone]  -Fever over 100 degree F  -Swelling, bleeding, redness, drainage, warmth at the injection site  -New or worsening pain    Please schedule a follow up appointment to see me in 6-8 weeks if your pain has not improved. You may call our direct clinic number ([phone]) at any time with questions or concerns.    Jordana Eden MD, CAQSM  [facility] Sports and Orthopedic Care    -----    SUBJECTIVE:  Gibran Conner is a 51 year old male who is seen in follow-up for right knee pain. They were last seen 3/10/22 and had right knee cortisone injection. Patient reports good relief lasting only ~ 1 week. Pain has  "gradually returned.     Since their last visit reports worsening pain throughout the day. They indicate that their current pain level is 2/10. They have tried rest/activity avoidance and ibuprofen PRN.      The patient is seen by themselves.    Patient's past medical, surgical, social, and family histories were reviewed today and no changes are noted.    REVIEW OF SYSTEMS:  Constitutional: NEGATIVE for fever, chills, change in weight  Skin: NEGATIVE for worrisome rashes, moles or lesions  GI/: NEGATIVE for bowel or bladder changes  Neuro: NEGATIVE for weakness, dizziness or paresthesias    OBJECTIVE:  /88   Ht 1.753 m (5' 9\")   Wt 127 kg (280 lb)   BMI 41.35 kg/m     General: healthy, alert and in no distress  HEENT: no scleral icterus or conjunctival erythema  Skin: no suspicious lesions or rash. No jaundice.  CV: regular rhythm by palpation, no pedal edema  Resp: normal respiratory effort without conversational dyspnea   Psych: normal mood and affect  Gait: normal steady gait with appropriate coordination and balance  Neuro: normal light touch sensory exam of the extremities.    MSK:  RIGHT KNEE  Inspection:    normal alignment, previous arthroscopy scars noted  Palpation:    Tender about the medial patellar facet and medial joint line. Remainder of bony and ligamentous landmarks are nontender.    Small effusion is present    Patellofemoral crepitus is Present  Range of Motion:     00 extension to 1200 flexion    Independent visualization of the below image:  Results for orders placed or performed in visit on 03/10/22   XR Knee Standing AP Bilat [place] Bilat Lat Right    Narrative    KNEE STANDING AP BILATERAL [place] BILATERAL LATERAL RIGHT   3/10/2022  11:00 AM     HISTORY: Chronic pain of right knee.    COMPARISON: None.      Impression    IMPRESSION:    Right: Moderate osteoarthrosis in the medial compartment. Mild  osteoarthrosis in the patellofemoral compartment. Trace effusion. No  evidence of " fracture or calcified intra-articular body.    Left: Negative.    TOMI CORRALES MD         SYSTEM ID:  [id number]     Large Joint Injection/Arthocentesis: R knee joint    Date/Time: 4/15/2022 10:29 AM  Performed by: Jordana Woody MD  Authorized by: Jordana Woody MD     Indications:  Pain and osteoarthritis  Needle Size:  22 G  Guidance: ultrasound    Approach:  Anterolateral  Location:  Knee      Medications:  4 mL lidocaine 1 %; 48 mg hylan 48 MG/6ML  Outcome:  Tolerated well, no immediate complications  Procedure discussed: discussed risks, benefits, and alternatives    Consent Given by:  Patient  Timeout: timeout called immediately prior to procedure    Prep: patient was prepped and draped in usual sterile fashion     Ultrasound was used to ensure safe and accurate needle placement and injection. Ultrasound images of the procedure were permanently stored.      Patient reported improvement in pain following the injection.    Jordana Eden MD, [facility] Sports and Orthopedic [facility]

## 2022-06-29 ENCOUNTER — OFFICE VISIT (OUTPATIENT)
Dept: ORTHOPEDICS | Facility: CLINIC | Age: 52
End: 2022-06-29
Payer: COMMERCIAL

## 2022-06-29 VITALS
WEIGHT: 280 LBS | SYSTOLIC BLOOD PRESSURE: 138 MMHG | BODY MASS INDEX: 41.47 KG/M2 | DIASTOLIC BLOOD PRESSURE: 80 MMHG | HEIGHT: 69 IN

## 2022-06-29 DIAGNOSIS — Z98.890 HISTORY OF MEDIAL MENISCUS REPAIR OF RIGHT KNEE: ICD-10-CM

## 2022-06-29 DIAGNOSIS — G89.29 CHRONIC PAIN OF RIGHT KNEE: Primary | ICD-10-CM

## 2022-06-29 DIAGNOSIS — M25.561 CHRONIC PAIN OF RIGHT KNEE: Primary | ICD-10-CM

## 2022-06-29 DIAGNOSIS — M17.11 PRIMARY OSTEOARTHRITIS OF RIGHT KNEE: ICD-10-CM

## 2022-06-29 PROCEDURE — 99214 OFFICE O/P EST MOD 30 MIN: CPT | Performed by: STUDENT IN AN ORGANIZED HEALTH CARE EDUCATION/TRAINING PROGRAM

## 2022-06-29 NOTE — PATIENT INSTRUCTIONS
1. Chronic pain of right knee    2. Primary osteoarthritis of right knee    3. History of medial meniscectomy      - Your right knee MRI has been ordered. You may call [phone] to schedule.     - Once you know the date of your MRI, please schedule a follow-up telephone visit with me ([phone]) for 2 days after that to discuss results.    You may call our direct clinic number ([phone]) at any time with questions or concerns.    Jordana Eden MD, [facility]

## 2022-06-29 NOTE — LETTER
6/29/2022         RE: Gibran Conner  [address] [address]        Dear Colleague,    Thank you for referring your patient, Gibran Conner, to the [facility] [place]. Please see a copy of my visit note below.    ASSESSMENT & PLAN    1. Chronic pain of right knee    2. Primary osteoarthritis of right knee    3. History of medial meniscectomy      Gibran Conner is a 51 year old male presenting for follow up of chronic right knee pain.  History, exam and XR findings were reviewed today, consistent with moderate medial compartment knee osteoarthritis in the setting of distant medical meniscus tear s/p medial meniscectomy many years ago. He has noted no improvement following cortisone and hyaluronic acid injections over the past several months in conjunction with activity modifications and home exercises. No noting increasing mechanical symptoms (primarily catching) and medial knee pain. Upon discussion of options, I would recommend proceeding with MRI of the right knee for further evaluation. Pending results, may further consider bracing (medial ), physical therapy or surgical referral.    Detailed plan:  - Your right knee MRI has been ordered. You may call [phone] to schedule.   - Once you know the date of your MRI, please schedule a follow-up telephone visit with me ([phone]) for 2 days after that to discuss results.    You may call our direct clinic number ([phone]) at any time with questions or concerns.    Jordana Eden MD, [facility] Sports and Orthopedic Care    -----    SUBJECTIVE:  Gibran Conner is a 52 year old male who is seen in follow-up for right knee pain.They were last seen 4/15/2022.     Since their last visit reports 0% - (About the same as last time). They indicate that their current pain level is 3/10. They have tried rest/activity avoidance, previous imaging (xray 3/10/22), corticosteroid  "injection (most recent date: 3/10/22), viscosupplementation injection (most recent date: 4/15/22) which provided no relief and CBD cream.      The patient is seen by themselves.    Patient's past medical, surgical, social, and family histories were reviewed today and no changes are noted.    REVIEW OF SYSTEMS:  Constitutional: NEGATIVE for fever, chills, change in weight  Skin: NEGATIVE for worrisome rashes, moles or lesions  GI/: NEGATIVE for bowel or bladder changes  Neuro: NEGATIVE for weakness, dizziness or paresthesias    OBJECTIVE:  /80   Ht 1.753 m (5' 9\")   Wt 127 kg (280 lb)   BMI 41.35 kg/m     General: healthy, alert and in no distress  HEENT: no scleral icterus or conjunctival erythema  Skin: no suspicious lesions or rash. No jaundice.  CV: regular rhythm by palpation, no pedal edema  Resp: normal respiratory effort without conversational dyspnea   Psych: normal mood and affect  Gait: antlagic gait with appropriate coordination and balance  Neuro: normal light touch sensory exam of the extremities.    MSK:  RIGHT KNEE  Inspection:    Normal alignment, previous arthroscopy scars noted  Palpation:    Tender about the medial patellar facet and medial joint line. Remainder of bony and ligamentous landmarks are nontender.    Small effusion is present    Patellofemoral crepitus is Present  Range of Motion:     00 extension to 1100 flexion  Strength:    Quadriceps 5-/5 and hamstrings 5-/5    Extensor mechanism intact  Special Tests:    Positive: mild patellar inhibition, painful mcmurrays and thessaly without clunk or instability    Negative: patellar apprehension, MCL/valgus stress (0 & 30 deg), LCL/varus stress (0 & 30 deg), Lachman's, anterior drawer, posterior drawer    Independent visualization of the below image:  Results for orders placed or performed in visit on 03/10/22   XR Knee Standing AP Bilat [place] Bilat Lat Right        KNEE STANDING AP BILATERAL [place] BILATERAL LATERAL RIGHT   " 3/10/2022  11:00 AM     HISTORY: Chronic pain of right knee.    COMPARISON: None.        IMPRESSION:    Right: Moderate osteoarthrosis in the medial compartment. Mild  osteoarthrosis in the patellofemoral compartment. Trace effusion. No  evidence of fracture or calcified intra-articular body.    Left: Negative.    MD Jordana BURNETT MD, [facility] Sports and Orthopedic Care              Again, thank you for allowing me to participate in the care of your patient.        Sincerely,        Jordana Eden MD

## 2022-06-29 NOTE — PROGRESS NOTES
ASSESSMENT & PLAN    1. Chronic pain of right knee    2. Primary osteoarthritis of right knee    3. History of medial meniscectomy      Gibran Conner is a 51 year old male presenting for follow up of chronic right knee pain.  History, exam and XR findings were reviewed today, consistent with moderate medial compartment knee osteoarthritis in the setting of distant medical meniscus tear s/p medial meniscectomy many years ago. He has noted no improvement following cortisone and hyaluronic acid injections over the past several months in conjunction with activity modifications and home exercises. No noting increasing mechanical symptoms (primarily catching) and medial knee pain. Upon discussion of options, I would recommend proceeding with MRI of the right knee for further evaluation. Pending results, may further consider bracing (medial ), physical therapy or surgical referral.    Detailed plan:  - Your right knee MRI has been ordered. You may call [phone] to schedule.   - Once you know the date of your MRI, please schedule a follow-up telephone visit with me ([phone]) for 2 days after that to discuss results.    You may call our direct clinic number ([phone]) at any time with questions or concerns.    Jordana Eden MD, [facility]    -----    SUBJECTIVE:  Gibran Conner is a 52 year old male who is seen in follow-up for right knee pain.They were last seen 4/15/2022.     Since their last visit reports 0% - (About the same as last time). They indicate that their current pain level is 3/10. They have tried rest/activity avoidance, previous imaging (xray 3/10/22), corticosteroid injection (most recent date: 3/10/22), viscosupplementation injection (most recent date: 4/15/22) which provided no relief and CBD cream.      The patient is seen by themselves.    Patient's past medical, surgical, social, and family histories were reviewed today and no  "changes are noted.    REVIEW OF SYSTEMS:  Constitutional: NEGATIVE for fever, chills, change in weight  Skin: NEGATIVE for worrisome rashes, moles or lesions  GI/: NEGATIVE for bowel or bladder changes  Neuro: NEGATIVE for weakness, dizziness or paresthesias    OBJECTIVE:  /80   Ht 1.753 m (5' 9\")   Wt 127 kg (280 lb)   BMI 41.35 kg/m     General: healthy, alert and in no distress  HEENT: no scleral icterus or conjunctival erythema  Skin: no suspicious lesions or rash. No jaundice.  CV: regular rhythm by palpation, no pedal edema  Resp: normal respiratory effort without conversational dyspnea   Psych: normal mood and affect  Gait: antlagic gait with appropriate coordination and balance  Neuro: normal light touch sensory exam of the extremities.    MSK:  RIGHT KNEE  Inspection:    Normal alignment, previous arthroscopy scars noted  Palpation:    Tender about the medial patellar facet and medial joint line. Remainder of bony and ligamentous landmarks are nontender.    Small effusion is present    Patellofemoral crepitus is Present  Range of Motion:     00 extension to 1100 flexion  Strength:    Quadriceps 5-/5 and hamstrings 5-/5    Extensor mechanism intact  Special Tests:    Positive: mild patellar inhibition, painful mcmurrays and thessaly without clunk or instability    Negative: patellar apprehension, MCL/valgus stress (0 & 30 deg), LCL/varus stress (0 & 30 deg), Lachman's, anterior drawer, posterior drawer    Independent visualization of the below image:  Results for orders placed or performed in visit on 03/10/22   XR Knee Standing AP Bilat [place] Bilat Lat Right        KNEE STANDING AP BILATERAL [place] BILATERAL LATERAL RIGHT   3/10/2022  11:00 AM     HISTORY: Chronic pain of right knee.    COMPARISON: None.        IMPRESSION:    Right: Moderate osteoarthrosis in the medial compartment. Mild  osteoarthrosis in the patellofemoral compartment. Trace effusion. No  evidence of fracture or calcified " intra-articular body.    Left: Negative.    MD Jordana BURNETT MD, CAM  [facility]

## 2022-07-11 ENCOUNTER — HOSPITAL ENCOUNTER (OUTPATIENT)
Dept: MRI IMAGING | Facility: CLINIC | Age: 52
Discharge: HOME | End: 2022-07-11
Attending: STUDENT IN AN ORGANIZED HEALTH CARE EDUCATION/TRAINING PROGRAM | Admitting: STUDENT IN AN ORGANIZED HEALTH CARE EDUCATION/TRAINING PROGRAM
Payer: COMMERCIAL

## 2022-07-11 DIAGNOSIS — M17.11 PRIMARY OSTEOARTHRITIS OF RIGHT KNEE: ICD-10-CM

## 2022-07-11 DIAGNOSIS — Z98.890 HISTORY OF MEDIAL MENISCUS REPAIR OF RIGHT KNEE: ICD-10-CM

## 2022-07-11 DIAGNOSIS — G89.29 CHRONIC PAIN OF RIGHT KNEE: ICD-10-CM

## 2022-07-11 DIAGNOSIS — M25.561 CHRONIC PAIN OF RIGHT KNEE: ICD-10-CM

## 2022-07-11 PROCEDURE — 73721 MRI JNT OF LWR EXTRE W/O DYE: CPT | Mod: RT

## 2022-07-11 PROCEDURE — 73721 MRI JNT OF LWR EXTRE W/O DYE: CPT | Mod: 26 | Performed by: RADIOLOGY

## 2022-07-18 ENCOUNTER — VIRTUAL VISIT (OUTPATIENT)
Dept: ORTHOPEDICS | Facility: CLINIC | Age: 52
End: 2022-07-18
Payer: COMMERCIAL

## 2022-07-18 DIAGNOSIS — M17.11 PRIMARY OSTEOARTHRITIS OF RIGHT KNEE: ICD-10-CM

## 2022-07-18 DIAGNOSIS — Z98.890 HISTORY OF MEDIAL MENISCUS REPAIR OF RIGHT KNEE: ICD-10-CM

## 2022-07-18 DIAGNOSIS — M23.303 DEGENERATION DISEASE OF MEDIAL MENISCUS OF RIGHT KNEE: ICD-10-CM

## 2022-07-18 DIAGNOSIS — G89.29 CHRONIC PAIN OF RIGHT KNEE: Primary | ICD-10-CM

## 2022-07-18 DIAGNOSIS — M25.561 CHRONIC PAIN OF RIGHT KNEE: Primary | ICD-10-CM

## 2022-07-18 PROCEDURE — 99212 OFFICE O/P EST SF 10 MIN: CPT | Mod: TEL | Performed by: STUDENT IN AN ORGANIZED HEALTH CARE EDUCATION/TRAINING PROGRAM

## 2022-07-18 NOTE — PROGRESS NOTES
Gibran is a 52 year old who is being evaluated via a billable telephone visit.    Phone call duration: 15 minutes    ASSESSMENT & PLAN  Patient Instructions     1. Chronic pain of right knee    2. Primary osteoarthritis of right knee    3. History of medial meniscectomy      Your right knee MRI showed further degenerative meniscus tearing and cartilage wear over the medial compartment, with moderate effusion and mild degenerative changes of the anterior and lateral compartments.    Has not noticed any improvement in symptoms following intra-articular cortisone (3/10/22) and hyaluronic acid (4/15/22) injections. We discussed further recommendations including a trial of medial  bracing and physical therapy. He is agreeable to trial of a medial  brace and is reluctant to proceed with PT. Referrals have been placed. Please call [phone] to schedule a knee brace fitting with our Orthotics department.     If no improvement with 6-8 weeks of unloading and rehab, will consider referral to orthopedic surgery. You may call our direct clinic number ([phone]) at any time with questions or concerns.    Jordana Eden MD, [facility]      -----    SUBJECTIVE:  Gibran Conner is a 52 year old male who is seen in follow-up for right knee pain. They were last seen 6/29/2022.     Since their last visit reports 0% - (About the same as last time). He has noted no change in symptoms with the previous treatments. They have tried rest/activity avoidance, previous imaging (MRI 7/11/22 and xray 3/10/22), corticosteroid injection (most recent date: 3/10/22), viscosupplementation injection (most recent date: 4/15/22) which provided no relief and CBD cream.      The patient is seen by themselves.    Patient's past medical, surgical, social, and family histories were reviewed today and no changes are noted.    REVIEW OF SYSTEMS:  Constitutional: NEGATIVE for fever, chills,  change in weight  Skin: NEGATIVE for worrisome rashes, moles or lesions  GI/: NEGATIVE for bowel or bladder changes  Neuro: NEGATIVE for weakness, dizziness or paresthesias    OBJECTIVE:  Limited by telephone visit    Independent visualization of the below image:  Results for orders placed or performed during the hospital encounter of 07/11/22   MR Knee Right w/o Contrast    Narrative    MR right knee without contrast 7/11/2022 1:21 PM    Techniques: Multiplanar multisequence imaging of the right knee was  obtained without administration of intra-articular or intravenous  contrast using routing protocol.    History: acute on chronic right knee pain, s/p medial meniscectomy  \R\20 years ago, concern for re-injury, r/o loose body; Chronic  pain of right knee; Chronic pain of right knee; Primary osteoarthritis  of right knee; History of medial meniscus repair of right knee    Comparison: CR knee 3/10/2022    Findings:    MENISCI:  Medial meniscus: Markedly diminutive medial meniscal body and  posterior horn, most compatible with prior partial meniscectomy.  Multidirectional signal in the body and posterior horn.  Lateral meniscus: Intact.    LIGAMENTS  Cruciate ligaments: Intact.  Medial supporting structures: Periligamentous edema of the tibial  collateral ligament, consistent with low to moderate grade sprain,  likely related to altered biomechanics due to underlying meniscal  pathology.  Lateral supporting structures: Intact.    EXTENSOR MECHANISM  Intact.    FLUID  Moderate joint effusion with mild synovitis. Moderate sized Baker's  cyst, measuring up to 3.4 cm.    OSSEOUS and ARTICULAR STRUCTURES  Bones: Opposing edema-like marrow signal intensity of medial tibial  plateau and medial femoral condyle.    Patellofemoral compartment: Mild-to-moderate chondral loss at the  medial trochlear cartilage.    Medial compartment: Full-thickness chondral loss at the medial tibial  plateau and femoral condyle with  subchondral edema-like T2  hyperintense signal.     Lateral compartment: Mild chondral loss at the lateral femoral condyle  and tibial plateau.    ANCILLARY FINDINGS  Subcutaneous edema anterior to the patellar tendons.        Impression:    1. Markedly diminutive medial meniscal body and posterior horn, most  compatible with prior partial meniscectomy. Multidirectional signal  most pronounced in the posterior horn suspicious for additional tear.  *  Grade 4 medial compartment chondromalacia.     2. Grade II chondromalacia at the lateral femoral condyle and tibia.  Grade II chondromalacia of the medial trochlear cartilage.    3. Moderate joint effusion with mild synovitis.    MD Jordana SAMANIEGO MD (Joe), [facility]

## 2022-07-18 NOTE — PATIENT INSTRUCTIONS
1. Chronic pain of right knee    2. Primary osteoarthritis of right knee    3. History of medial meniscectomy      Your right knee MRI showed further degenerative meniscus tearing and cartilage wear over the medial compartment, with moderate effusion and mild degenerative changes of the anterior and lateral compartments.    Has not noticed any improvement in symptoms following intra-articular cortisone (3/10/22) and hyaluronic acid (4/15/22) injections. We discussed further recommendations including a trial of medial  bracing and physical therapy. He is agreeable to trial of a medial  brace. Referrals have been placed. Please call [phone] to schedule a knee brace fitting with our Orthotics department.     If no improvement with 6-8 weeks of unloading and rehab, will consider referral to orthopedic surgery. You may call our direct clinic number ([phone]) at any time with questions or concerns.    Jordana Eden MD, [facility]

## 2022-07-18 NOTE — LETTER
7/18/2022         RE: Gibran Conner  [address] [address]        Dear Colleague,    Thank you for referring your patient, Gibran Conner, to the [facility] [place]. Please see a copy of my visit note below.    Gibran is a 52 year old who is being evaluated via a billable telephone visit.    Phone call duration: 15 minutes    ASSESSMENT & PLAN  Patient Instructions     1. Chronic pain of right knee    2. Primary osteoarthritis of right knee    3. History of medial meniscectomy      Your right knee MRI showed further degenerative meniscus tearing and cartilage wear over the medial compartment, with moderate effusion and mild degenerative changes of the anterior and lateral compartments.    Has not noticed any improvement in symptoms following intra-articular cortisone (3/10/22) and hyaluronic acid (4/15/22) injections. We discussed further recommendations including a trial of medial  bracing and physical therapy. He is agreeable to trial of a medial  brace and is reluctant to proceed with PT. Referrals have been placed. Please call [phone] to schedule a knee brace fitting with our Orthotics department.     If no improvement with 6-8 weeks of unloading and rehab, will consider referral to orthopedic surgery. You may call our direct clinic number ([phone]) at any time with questions or concerns.    Jordana Eden MD, [facility]      -----    SUBJECTIVE:  Gibran Conner is a 52 year old male who is seen in follow-up for right knee pain. They were last seen 6/29/2022.     Since their last visit reports 0% - (About the same as last time). He has noted no change in symptoms with the previous treatments. They have tried rest/activity avoidance, previous imaging (MRI 7/11/22 and xray 3/10/22), corticosteroid injection (most recent date: 3/10/22), viscosupplementation injection (most recent  date: 4/15/22) which provided no relief and CBD cream.      The patient is seen by themselves.    Patient's past medical, surgical, social, and family histories were reviewed today and no changes are noted.    REVIEW OF SYSTEMS:  Constitutional: NEGATIVE for fever, chills, change in weight  Skin: NEGATIVE for worrisome rashes, moles or lesions  GI/: NEGATIVE for bowel or bladder changes  Neuro: NEGATIVE for weakness, dizziness or paresthesias    OBJECTIVE:  Limited by telephone visit    Independent visualization of the below image:  Results for orders placed or performed during the hospital encounter of 07/11/22   MR Knee Right w/o Contrast    Narrative    MR right knee without contrast 7/11/2022 1:21 PM    Techniques: Multiplanar multisequence imaging of the right knee was  obtained without administration of intra-articular or intravenous  contrast using routing protocol.    History: acute on chronic right knee pain, s/p medial meniscectomy  \R\20 years ago, concern for re-injury, r/o loose body; Chronic  pain of right knee; Chronic pain of right knee; Primary osteoarthritis  of right knee; History of medial meniscus repair of right knee    Comparison: CR knee 3/10/2022    Findings:    MENISCI:  Medial meniscus: Markedly diminutive medial meniscal body and  posterior horn, most compatible with prior partial meniscectomy.  Multidirectional signal in the body and posterior horn.  Lateral meniscus: Intact.    LIGAMENTS  Cruciate ligaments: Intact.  Medial supporting structures: Periligamentous edema of the tibial  collateral ligament, consistent with low to moderate grade sprain,  likely related to altered biomechanics due to underlying meniscal  pathology.  Lateral supporting structures: Intact.    EXTENSOR MECHANISM  Intact.    FLUID  Moderate joint effusion with mild synovitis. Moderate sized Baker's  cyst, measuring up to 3.4 cm.    OSSEOUS and ARTICULAR STRUCTURES  Bones: Opposing edema-like marrow signal  intensity of medial tibial  plateau and medial femoral condyle.    Patellofemoral compartment: Mild-to-moderate chondral loss at the  medial trochlear cartilage.    Medial compartment: Full-thickness chondral loss at the medial tibial  plateau and femoral condyle with subchondral edema-like T2  hyperintense signal.     Lateral compartment: Mild chondral loss at the lateral femoral condyle  and tibial plateau.    ANCILLARY FINDINGS  Subcutaneous edema anterior to the patellar tendons.        Impression:    1. Markedly diminutive medial meniscal body and posterior horn, most  compatible with prior partial meniscectomy. Multidirectional signal  most pronounced in the posterior horn suspicious for additional tear.  *  Grade 4 medial compartment chondromalacia.     2. Grade II chondromalacia at the lateral femoral condyle and tibia.  Grade II chondromalacia of the medial trochlear cartilage.    3. Moderate joint effusion with mild synovitis.    MD Jordana SAMANIEGO MD (Joe), [facility]            Again, thank you for allowing me to participate in the care of your patient.        Sincerely,        Jordnaa Eden MD

## 2022-09-03 ENCOUNTER — HEALTH MAINTENANCE LETTER (OUTPATIENT)
Age: 52
End: 2022-09-03

## 2022-09-15 NOTE — PROGRESS NOTES
Gibran is a 52 year old who is being evaluated via a billable telephone visit.      What phone number would you like to be contacted at? [phone]  How would you like to obtain your AVS? Osminkarthik  Phone call duration: 12 minutes    ASSESSMENT & PLAN  Patient Instructions     1. Chronic pain of right knee    2. Primary osteoarthritis of right knee    3. Degenerative tear of medial meniscus, right      Gibran Conner is a 52 year old male calling in for follow up of chronic right knee pain.  History, exam, XR and MRI findings were reviewed today, consistent with tricompartmental osteoarthritis most advanced in the medial compartment in the setting of distant medical meniscus tear s/p medial meniscectomy many years ago. Has not noticed any improvement in symptoms following intra-articular cortisone (3/10/22) and hyaluronic acid (4/15/22) injections in conjunction with activity modifications, home exercises and a trial of medial  bracing. We discussed further management options and patient elects to proceed with surgical consult. Orthopedic  referral placed to meet with Dr. Isabel at the [facility].     You may call our direct clinic number ([phone]) at any time with questions or concerns.    Jordana Eden MD, [facility]      -----    SUBJECTIVE:  Gibran Conner is a 52 year old male who is seen in follow-up for chronic right knee pain. They were last seen 7/18/2022 has continued to experience knee pain despite the addition of a hinged knee brace. Pain increases with activities and now significantly limit his ability to be active.     Since their last visit reports 0% - (About the same as last time). They indicate that their current pain level is 9/10.when it is most bothersome.  They have tried activity modifications, home exercises, cortisone injection, viscosupplementation, casting/splinting/bracing.      Patient's past medical,  surgical, social, and family histories were reviewed today and no changes are noted.    REVIEW OF SYSTEMS:  Constitutional: NEGATIVE for fever, chills, change in weight  Skin: NEGATIVE for worrisome rashes, moles or lesions  GI/: NEGATIVE for bowel or bladder changes  Neuro: NEGATIVE for weakness, dizziness or paresthesias    OBJECTIVE:  Limited by telephone visit  General: healthy, alert and in no distress    Independent visualization of the below image:    MR right knee without contrast 7/11/2022 1:21 PM   Impression:     1. Markedly diminutive medial meniscal body and posterior horn, most  compatible with prior partial meniscectomy. Multidirectional signal  most pronounced in the posterior horn suspicious for additional tear.  *  Grade 4 medial compartment chondromalacia.      2. Grade II chondromalacia at the lateral femoral condyle and tibia.  Grade II chondromalacia of the medial trochlear cartilage.     3. Moderate joint effusion with mild synovitis.     MD Jordana SAMANIEGO MD (Joe), CAM  [facility]

## 2022-09-26 ENCOUNTER — VIRTUAL VISIT (OUTPATIENT)
Dept: ORTHOPEDICS | Facility: CLINIC | Age: 52
End: 2022-09-26
Payer: COMMERCIAL

## 2022-09-26 DIAGNOSIS — M17.11 PRIMARY OSTEOARTHRITIS OF RIGHT KNEE: ICD-10-CM

## 2022-09-26 DIAGNOSIS — M23.203 DEGENERATIVE TEAR OF MEDIAL MENISCUS, RIGHT: ICD-10-CM

## 2022-09-26 DIAGNOSIS — M25.561 CHRONIC PAIN OF RIGHT KNEE: Primary | ICD-10-CM

## 2022-09-26 DIAGNOSIS — G89.29 CHRONIC PAIN OF RIGHT KNEE: Primary | ICD-10-CM

## 2022-09-26 PROCEDURE — 99213 OFFICE O/P EST LOW 20 MIN: CPT | Mod: TEL | Performed by: STUDENT IN AN ORGANIZED HEALTH CARE EDUCATION/TRAINING PROGRAM

## 2022-09-26 NOTE — PATIENT INSTRUCTIONS
1. Chronic pain of right knee    2. Primary osteoarthritis of right knee    3. Degenerative tear of medial meniscus, right      Gibran Conner is a 52 year old male calling in for follow up of chronic right knee pain.  History, exam, XR and MRI findings were reviewed today, consistent with tricompartmental osteoarthritis most advanced in the medial compartment in the setting of distant medical meniscus tear s/p medial meniscectomy many years ago. Has not noticed any improvement in symptoms following intra-articular cortisone (3/10/22) and hyaluronic acid (4/15/22) injections in conjunction with activity modifications, home exercises and a trial of medial  bracing. We discussed further management options and patient elects to proceed with surgical consult. Orthopedic  referral placed to meet with Dr. Isabel at the [facility].     You may call our direct clinic number ([phone]) at any time with questions or concerns.    Jordana Eden MD, [facility]

## 2022-09-26 NOTE — LETTER
9/26/2022         RE: Gibran Conner  [address] [address]        Dear Colleague,    Thank you for referring your patient, Gibran Conner, to the [facility] [place]. Please see a copy of my visit note below.    Gibran is a 52 year old who is being evaluated via a billable telephone visit.      What phone number would you like to be contacted at? [phone]  How would you like to obtain your AVS? MyChart  Phone call duration: 12 minutes    ASSESSMENT & PLAN  Patient Instructions     1. Chronic pain of right knee    2. Primary osteoarthritis of right knee    3. Degenerative tear of medial meniscus, right      Gibran Conner is a 52 year old male calling in for follow up of chronic right knee pain.  History, exam, XR and MRI findings were reviewed today, consistent with tricompartmental osteoarthritis most advanced in the medial compartment in the setting of distant medical meniscus tear s/p medial meniscectomy many years ago. Has not noticed any improvement in symptoms following intra-articular cortisone (3/10/22) and hyaluronic acid (4/15/22) injections in conjunction with activity modifications, home exercises and a trial of medial  bracing. We discussed further management options and patient elects to proceed with surgical consult. Orthopedic  referral placed to meet with Dr. Isabel at the [facility].     You may call our direct clinic number ([phone]) at any time with questions or concerns.    Jordana Eden MD, [facility]      -----    SUBJECTIVE:  Gibran Conner is a 52 year old male who is seen in follow-up for chronic right knee pain. They were last seen 7/18/2022 has continued to experience knee pain despite the addition of a hinged knee brace. Pain increases with activities and now significantly limit his ability to be active.     Since their last visit reports 0% -  (About the same as last time). They indicate that their current pain level is 9/10.when it is most bothersome.  They have tried activity modifications, home exercises, cortisone injection, viscosupplementation, casting/splinting/bracing.      Patient's past medical, surgical, social, and family histories were reviewed today and no changes are noted.    REVIEW OF SYSTEMS:  Constitutional: NEGATIVE for fever, chills, change in weight  Skin: NEGATIVE for worrisome rashes, moles or lesions  GI/: NEGATIVE for bowel or bladder changes  Neuro: NEGATIVE for weakness, dizziness or paresthesias    OBJECTIVE:  Limited by telephone visit  General: healthy, alert and in no distress    Independent visualization of the below image:    MR right knee without contrast 7/11/2022 1:21 PM   Impression:     1. Markedly diminutive medial meniscal body and posterior horn, most  compatible with prior partial meniscectomy. Multidirectional signal  most pronounced in the posterior horn suspicious for additional tear.  *  Grade 4 medial compartment chondromalacia.      2. Grade II chondromalacia at the lateral femoral condyle and tibia.  Grade II chondromalacia of the medial trochlear cartilage.     3. Moderate joint effusion with mild synovitis.     MD Jordana SAMANIEGO MD (Joe), [facility]              Again, thank you for allowing me to participate in the care of your patient.        Sincerely,        Jordana Eden MD

## 2022-10-31 NOTE — PROGRESS NOTES
"    [facility]  Orthopaedic Surgery Consultation by Shabbir Isabel M.D.    Gibran Conner MRN# [id number]   Age: 52 year old YOB: 1970     Requesting physician: Matt Wall     Background history:  DX:  1. Hypertension  2. Type 2 diabetes mellitus latest A1c 7.2 according to patient    TREATMENTS:  1. Right knee menisectomy ~20 years ago            History of Present Illness:   52 year old male who presents to our clinic because of chronic right knee pain.  This pain has been present for over a year without antecedent trauma.  Patient has a history of right medial meniscectomy.  Pain is noticed on the medial side of the knee.  The knee swells occasionally.  He describes the presence of crepitus.  No clear mechanical symptoms of locking.  No giving way.  Patient reports the presence of night pain, initiation stiffness and soreness.  He ambulates with a limp.  He has trouble walking around the block currently.  His pain is greatly decreasing his activity level which has led to an increase in weight.  His quality of life is significantly impaired.  Mitigate the pain he has trialed over-the-counter analgesics, activity modification, medial  bracing, physical therapy/home exercise regimen and intra-articular injection.  None of these have provided significant or long-term relief.    Social:   Occupation:    Living situation: lives alone, single in home.  Parents are currently in town and are able to help out in postsurgical phase.  Hobbies / Sports: likes fishing/ice fishing    Smoking: No  Alcohol: Yes  Illicit drug use: No         Physical Exam:     EXAMINATION pertinent findings:   PSYCH: Pleasant, healthy-appearing, alert, oriented x3, cooperative. Normal mood and affect.  VITAL SIGNS: Blood pressure (!) 146/82, height 1.753 m (5' 9.02\"), weight 131.5 kg (290 lb).  Reviewed nursing intake notes.   Body mass index is 42.81 kg/m .  RESP: non " labored breathing   ABD: benign, soft, non-tender, no acute peritoneal findings  SKIN: grossly normal   LYMPHATIC: grossly normal, no adenopathy, no extremity edema  NEURO: grossly normal , no motor deficits  VASCULAR: satisfactory perfusion of all extremities   MUSCULOSKELETAL:   Alignment: Varus alignment of right lower extremity.  Gait: Antalgic gait over right lower extremity.    The right hip exhibits a full range of motion.  No pain upon rotations.  This axis is negative.  No tenderness to palpation over greater trochanteric region.    R knee: -0-0  .  Deep flexion is recognizably painful.  Straight leg raise +. No redness, warmth or skin changes present. Effusion minimal. Ligamentously stable in both ML and AP direction.  +1 laxity in ML direction most likely due to substance loss.  Normal PF tracking without crepitus. Rabot -. Apprehension -. Meniscal provocation tests are sensitive.  There is recognizable tenderness to palpation over the joint line.     Right LE:   Thigh and leg compartments soft and compressible   +Quad/TA/GSC/FHL/EHL   SILT DP/SP/Marilou/Saph/Tib nerve distributions   Palpable dorsalis pedis pulse          Data:   All laboratory data reviewed  All imaging studies reviewed by me personally.    XR knee right 3/10/2022:  My interpretation: Significant joint space narrowing medial compartment.  Presence of small marginal osteophyte.  Well-preserved lateral and patellofemoral compartments.     MRI knee right 7/11/2022:  My interpretation: Grade IV chondromalacia of medial compartment.  Degenerative meniscal tear.  Associated subchondral bone marrow edema medial tibial plateau and medial femoral condyle.  Intact ACL.  Relatively well-preserved lateral and patellofemoral compartments.         Assessment and Plan:   Assessment:  52-year-old male presenting with chronic right knee pain due to end-stage osteoarthritic changes in medial compartment.  Insufficiently responding to nonsurgical  treatment options.     Plan:  I had a long discussion with the patient regarding ongoing management options.  Reviewed surgical and nonsurgical treatments.  The non-surgical options include activity modification, pain medication, weight reduction, PT, bracing and injection therapy.  A referral to the comprehensive weight management clinic was offered but declined by patient at this point in time.  Other than that he has failed all nonsurgical treatment options.  As for surgery we discussed the options of  partial and total knee replacement surgery. We reviewed partial knee replacement in detail including the procedure, the implants, the recovery process, and long-term outcomes.  We reviewed that the risks of the surgery include but are not limited to infection, wound problems, stiffness, persistent pain, swelling, clicking, loosening, revision surgery.  We also reviewed less common risks such as neurovascular injury fracture, and other implant-related issues.  We reviewed other medical complications such as a blood clot.  We discussed that the vast majority of cases have a highly successful outcome.  However there is a small subset of patients that do experience complications or problems following the knee replacement and these problems can be very debilitating and painful and sometimes do not improve.   Based on a discussion of the risks and benefits, we believe that the benefits far outweigh the risks at this point and the patient  would like to proceed with right knee medial condylar knee replacement surgery.  We will work on scheduling surgery at a time that works well for patient in the next few months.  Patient will contact us if there are any questions or concerns leading up to surgery. Before surgery the patient will attend a joint replacement class and will be seen by the PCP/anesthesiologist and dentist.     More information on joint replacements can be found on :  [email or web address]    Thank you for your referral.    Shabbir Isabel MD, PhD     Adult Reconstruction  [facility] Department of Orthopaedic Surgery        DATA for DOCUMENTATION:         Past Medical History:   There is no problem list on file for this patient.    Past Medical History:   Diagnosis Date     Diabetes (H) 2018    new DX     Hypertension      Sleep apnea     CPAP       Also see scanned health assessment forms.       Past Surgical History:     Past Surgical History:   Procedure Laterality Date     ARTHROSCOPY SHOULDER, OPEN ROTATOR CUFF REPAIR, COMBINED Right 2018    Procedure: COMBINED ARTHROSCOPY SHOULDER, OPEN ROTATOR CUFF REPAIR;  1.  Right shoulder arthroscopic subcoracoid decompression.   2.  Right shoulder arthroscopic subacromial bursectomy and limited debridement.   3.  Open subscapularis tendon repair, right shoulder.   4.  Open biceps tenodesis, right shoulder. ;  Surgeon: Cuauhtemoc Monroy MD;  Location: [facility] OR     BACK SURGERY       ORTHOPEDIC SURGERY Right 2013    shoulder     ORTHOPEDIC SURGERY      meniscus            Social History:     Social History     Socioeconomic History     Marital status: Single     Spouse name: Not on file     Number of children: Not on file     Years of education: Not on file     Highest education level: Not on file   Occupational History     Not on file   Tobacco Use     Smoking status: Former     Packs/day: 1.00     Years: 30.00     Pack years: 30.00     Types: Cigarettes     Quit date:      Years since quittin.8     Smokeless tobacco: Never   Substance and Sexual Activity     Alcohol use: Yes     Comment: daily 3-4     Drug use: No     Sexual activity: Not on file   Other Topics Concern     Not on file   Social History Narrative     Not on file     Social Determinants of Health     Financial Resource Strain: Not on file   Food Insecurity: Not on file    Transportation Needs: Not on file   Physical Activity: Not on file   Stress: Not on file   Social Connections: Not on file   Intimate Partner Violence: Not on file   Housing Stability: Not on file            Family History:       Family History   Problem Relation Age of Onset     Diabetes Mother      Cancer Mother      Heart Disease Father      Cerebrovascular Disease Father      Diabetes Father             Medications:     Current Outpatient Medications   Medication Sig     aspirin (ASA) 81 MG chewable tablet Take 81 mg by mouth daily     hydrOXYzine (ATARAX) 10 MG tablet Take 1 tablet (10 mg) by mouth every 6 hours as needed for itching (and nausea) (Patient not taking: No sig reported)     LISINOPRIL PO Take 40 mg by mouth daily      metFORMIN (GLUCOPHAGE) 500 MG tablet Take 500 mg by mouth 2 times daily (with meals)     Multiple Vitamins-Minerals (MULTIVITAMIN ADULT PO) Take by mouth daily (Patient not taking: No sig reported)     OMEPRAZOLE PO Take 40 mg by mouth as needed (Patient not taking: No sig reported)     oxyCODONE IR (ROXICODONE) 5 MG tablet Take 1-2 tablets (5-10 mg) by mouth every 3 hours as needed for pain or other (Moderate to Severe) (Patient not taking: No sig reported)     senna-docusate (SENOKOT-S;PERICOLACE) 8.6-50 MG per tablet Take 1-2 tablets by mouth 2 times daily Take while on oral narcotics to prevent or treat constipation. (Patient not taking: No sig reported)     No current facility-administered medications for this visit.              Review of Systems:   A comprehensive 10 point review of systems (constitutional, ENT, cardiac, peripheral vascular, lymphatic, respiratory, GI, , Musculoskeletal, skin, Neurological) was performed and found to be negative except as described in this note.     See intake form completed by patient

## 2022-11-07 ENCOUNTER — OFFICE VISIT (OUTPATIENT)
Dept: ORTHOPEDICS | Facility: CLINIC | Age: 52
End: 2022-11-07
Attending: STUDENT IN AN ORGANIZED HEALTH CARE EDUCATION/TRAINING PROGRAM
Payer: COMMERCIAL

## 2022-11-07 VITALS
HEIGHT: 69 IN | SYSTOLIC BLOOD PRESSURE: 146 MMHG | WEIGHT: 290 LBS | BODY MASS INDEX: 42.95 KG/M2 | DIASTOLIC BLOOD PRESSURE: 82 MMHG

## 2022-11-07 DIAGNOSIS — M23.203 DEGENERATIVE TEAR OF MEDIAL MENISCUS, RIGHT: ICD-10-CM

## 2022-11-07 DIAGNOSIS — M25.561 CHRONIC PAIN OF RIGHT KNEE: ICD-10-CM

## 2022-11-07 DIAGNOSIS — G89.29 CHRONIC PAIN OF RIGHT KNEE: ICD-10-CM

## 2022-11-07 DIAGNOSIS — M17.11 PRIMARY OSTEOARTHRITIS OF RIGHT KNEE: ICD-10-CM

## 2022-11-07 PROCEDURE — 99204 OFFICE O/P NEW MOD 45 MIN: CPT | Performed by: STUDENT IN AN ORGANIZED HEALTH CARE EDUCATION/TRAINING PROGRAM

## 2022-11-07 RX ORDER — TRANEXAMIC ACID 650 MG/1
1950 TABLET ORAL ONCE
Status: CANCELLED | OUTPATIENT
Start: 2022-11-07 | End: 2022-11-07

## 2022-11-07 ASSESSMENT — KOOS JR
BENDING TO THE FLOOR TO PICK UP OBJECT: MODERATE
HOW SEVERE IS YOUR KNEE STIFFNESS AFTER FIRST WAKING IN MORNING: MODERATE
KOOS JR SCORING: 54.84
TWISING OR PIVOTING ON KNEE: MODERATE
GOING UP OR DOWN STAIRS: MODERATE
RISING FROM SITTING: MODERATE
STRAIGHTENING KNEE FULLY: MILD
STANDING UPRIGHT: MODERATE

## 2022-11-07 NOTE — PATIENT INSTRUCTIONS
1. Chronic pain of right knee    2. Primary osteoarthritis of right knee    3. Degenerative tear of medial meniscus, right      Chris, Surgery Scheduler will contact you to assist with scheduling surgery.   You can contact her directly at [phone].   Prior to your scheduled surgery we advise scheduling with your dentist to obtain clearance for surgery, and to complete any recommended dental work prior.     For mor information regarding total joint surgery please visit our website:   [email or web address]    FORMS:   If you are needing any forms completed relating to your upcoming procedure, please send them to our office with a completed Release of Information.   Forms will be completed AFTER your procedure. A letter can be sent to your employer prior to surgery to inform them of your anticipated time off.    Please notify our staff if you would like a letter to do so.   Forms can be faxed directly to our clinic at [phone].     DO NOT BRING FORMS ON THE DATE OF SURGERY.     MEDICATION REFILL:   Please allow 3 business days for completion of medication refills for any surgery related prescription.   Medication refills submitted on Friday, may not be addressed until the following Monday.  You may request a refill via [employer], or by calling our Nurse Triage at [phone], option 3.       Call my office with any questions or concerns, [phone].

## 2022-11-07 NOTE — LETTER
11/7/2022         RE: Gibran Conner  [address] [address]        Dear Colleague,    Thank you for referring your patient, Gibran Conner, to the [facility] [place]. Please see a copy of my visit note below.        [facility]  Orthopaedic Surgery Consultation by Shabbir Isabel M.D.    Gibran Conner MRN# [id number]   Age: 52 year old YOB: 1970     Requesting physician: Matt Wall     Background history:  DX:  1. Hypertension  2. Type 2 diabetes mellitus latest A1c 7.2 according to patient    TREATMENTS:  1. Right knee menisectomy ~20 years ago            History of Present Illness:   52 year old male who presents to our clinic because of chronic right knee pain.  This pain has been present for over a year without antecedent trauma.  Patient has a history of right medial meniscectomy.  Pain is noticed on the medial side of the knee.  The knee swells occasionally.  He describes the presence of crepitus.  No clear mechanical symptoms of locking.  No giving way.  Patient reports the presence of night pain, initiation stiffness and soreness.  He ambulates with a limp.  He has trouble walking around the block currently.  His pain is greatly decreasing his activity level which has led to an increase in weight.  His quality of life is significantly impaired.  Mitigate the pain he has trialed over-the-counter analgesics, activity modification, medial  bracing, physical therapy/home exercise regimen and intra-articular injection.  None of these have provided significant or long-term relief.    Social:   Occupation:    Living situation: lives alone, single in home.  Parents are currently in town and are able to help out in postsurgical phase.  Hobbies / Sports: likes fishing/ice fishing    Smoking: No  Alcohol: Yes  Illicit drug use: No         Physical Exam:     EXAMINATION pertinent findings:  "  PSYCH: Pleasant, healthy-appearing, alert, oriented x3, cooperative. Normal mood and affect.  VITAL SIGNS: Blood pressure (!) 146/82, height 1.753 m (5' 9.02\"), weight 131.5 kg (290 lb).  Reviewed nursing intake notes.   Body mass index is 42.81 kg/m .  RESP: non labored breathing   ABD: benign, soft, non-tender, no acute peritoneal findings  SKIN: grossly normal   LYMPHATIC: grossly normal, no adenopathy, no extremity edema  NEURO: grossly normal , no motor deficits  VASCULAR: satisfactory perfusion of all extremities   MUSCULOSKELETAL:   Alignment: Varus alignment of right lower extremity.  Gait: Antalgic gait over right lower extremity.    The right hip exhibits a full range of motion.  No pain upon rotations.  This axis is negative.  No tenderness to palpation over greater trochanteric region.    R knee: -0-0  .  Deep flexion is recognizably painful.  Straight leg raise +. No redness, warmth or skin changes present. Effusion minimal. Ligamentously stable in both ML and AP direction.  +1 laxity in ML direction most likely due to substance loss.  Normal PF tracking without crepitus. Rabot -. Apprehension -. Meniscal provocation tests are sensitive.  There is recognizable tenderness to palpation over the joint line.     Right LE:   Thigh and leg compartments soft and compressible   +Quad/TA/GSC/FHL/EHL   SILT DP/SP/Marilou/Saph/Tib nerve distributions   Palpable dorsalis pedis pulse          Data:   All laboratory data reviewed  All imaging studies reviewed by me personally.    XR knee right 3/10/2022:  My interpretation: Significant joint space narrowing medial compartment.  Presence of small marginal osteophyte.  Well-preserved lateral and patellofemoral compartments.     MRI knee right 7/11/2022:  My interpretation: Grade IV chondromalacia of medial compartment.  Degenerative meniscal tear.  Associated subchondral bone marrow edema medial tibial plateau and medial femoral condyle.  Intact ACL.  Relatively " well-preserved lateral and patellofemoral compartments.         Assessment and Plan:   Assessment:  52-year-old male presenting with chronic right knee pain due to end-stage osteoarthritic changes in medial compartment.  Insufficiently responding to nonsurgical treatment options.     Plan:  I had a long discussion with the patient regarding ongoing management options.  Reviewed surgical and nonsurgical treatments.  The non-surgical options include activity modification, pain medication, weight reduction, PT, bracing and injection therapy.  A referral to the comprehensive weight management clinic was offered but declined by patient at this point in time.  Other than that he has failed all nonsurgical treatment options.  As for surgery we discussed the options of  partial and total knee replacement surgery. We reviewed partial knee replacement in detail including the procedure, the implants, the recovery process, and long-term outcomes.  We reviewed that the risks of the surgery include but are not limited to infection, wound problems, stiffness, persistent pain, swelling, clicking, loosening, revision surgery.  We also reviewed less common risks such as neurovascular injury fracture, and other implant-related issues.  We reviewed other medical complications such as a blood clot.  We discussed that the vast majority of cases have a highly successful outcome.  However there is a small subset of patients that do experience complications or problems following the knee replacement and these problems can be very debilitating and painful and sometimes do not improve.   Based on a discussion of the risks and benefits, we believe that the benefits far outweigh the risks at this point and the patient  would like to proceed with right knee medial condylar knee replacement surgery.  We will work on scheduling surgery at a time that works well for patient in the next few months.  Patient will contact us if there are any questions  or concerns leading up to surgery. Before surgery the patient will attend a joint replacement class and will be seen by the PCP/anesthesiologist and dentist.     More information on joint replacements can be found on : [email or web address]    Thank you for your referral.    Shabbir Isabel MD, PhD     Adult Reconstruction  [facility] Department of Orthopaedic Surgery        DATA for DOCUMENTATION:         Past Medical History:   There is no problem list on file for this patient.    Past Medical History:   Diagnosis Date     Diabetes (H) 2018    new DX     Hypertension      Sleep apnea     CPAP       Also see scanned health assessment forms.       Past Surgical History:     Past Surgical History:   Procedure Laterality Date     ARTHROSCOPY SHOULDER, OPEN ROTATOR CUFF REPAIR, COMBINED Right 2018    Procedure: COMBINED ARTHROSCOPY SHOULDER, OPEN ROTATOR CUFF REPAIR;  1.  Right shoulder arthroscopic subcoracoid decompression.   2.  Right shoulder arthroscopic subacromial bursectomy and limited debridement.   3.  Open subscapularis tendon repair, right shoulder.   4.  Open biceps tenodesis, right shoulder. ;  Surgeon: Cuauhtemoc Monroy MD;  Location: [facility] OR     BACK SURGERY       ORTHOPEDIC SURGERY Right 2013    shoulder     ORTHOPEDIC SURGERY      meniscus            Social History:     Social History     Socioeconomic History     Marital status: Single     Spouse name: Not on file     Number of children: Not on file     Years of education: Not on file     Highest education level: Not on file   Occupational History     Not on file   Tobacco Use     Smoking status: Former     Packs/day: 1.00     Years: 30.00     Pack years: 30.00     Types: Cigarettes     Quit date:      Years since quittin.8     Smokeless tobacco: Never   Substance and Sexual Activity     Alcohol use: Yes     Comment: daily 3-4     Drug use: No     Sexual  activity: Not on file   Other Topics Concern     Not on file   Social History Narrative     Not on file     Social Determinants of Health     Financial Resource Strain: Not on file   Food Insecurity: Not on file   Transportation Needs: Not on file   Physical Activity: Not on file   Stress: Not on file   Social Connections: Not on file   Intimate Partner Violence: Not on file   Housing Stability: Not on file            Family History:       Family History   Problem Relation Age of Onset     Diabetes Mother      Cancer Mother      Heart Disease Father      Cerebrovascular Disease Father      Diabetes Father             Medications:     Current Outpatient Medications   Medication Sig     aspirin (ASA) 81 MG chewable tablet Take 81 mg by mouth daily     hydrOXYzine (ATARAX) 10 MG tablet Take 1 tablet (10 mg) by mouth every 6 hours as needed for itching (and nausea) (Patient not taking: No sig reported)     LISINOPRIL PO Take 40 mg by mouth daily      metFORMIN (GLUCOPHAGE) 500 MG tablet Take 500 mg by mouth 2 times daily (with meals)     Multiple Vitamins-Minerals (MULTIVITAMIN ADULT PO) Take by mouth daily (Patient not taking: No sig reported)     OMEPRAZOLE PO Take 40 mg by mouth as needed (Patient not taking: No sig reported)     oxyCODONE IR (ROXICODONE) 5 MG tablet Take 1-2 tablets (5-10 mg) by mouth every 3 hours as needed for pain or other (Moderate to Severe) (Patient not taking: No sig reported)     senna-docusate (SENOKOT-S;PERICOLACE) 8.6-50 MG per tablet Take 1-2 tablets by mouth 2 times daily Take while on oral narcotics to prevent or treat constipation. (Patient not taking: No sig reported)     No current facility-administered medications for this visit.              Review of Systems:   A comprehensive 10 point review of systems (constitutional, ENT, cardiac, peripheral vascular, lymphatic, respiratory, GI, , Musculoskeletal, skin, Neurological) was performed and found to be negative except as described  in this note.     See intake form completed by patient        Again, thank you for allowing me to participate in the care of your patient.        Sincerely,        Shabbir Isabel MD

## 2022-11-11 ENCOUNTER — TELEPHONE (OUTPATIENT)
Dept: ORTHOPEDICS | Facility: CLINIC | Age: 52
End: 2022-11-11

## 2022-11-11 DIAGNOSIS — M17.11 PRIMARY OSTEOARTHRITIS OF RIGHT KNEE: Primary | ICD-10-CM

## 2022-11-11 NOTE — TELEPHONE ENCOUNTER
Reason for call:  schedule surgery    Phone number to reach patient:  Cell number on file:    Telephone Information:   Mobile [phone]       Best Time:  any    Can we leave a detailed message on this number?  YES

## 2022-11-15 NOTE — TELEPHONE ENCOUNTER
FUTURE VISIT INFORMATION      SURGERY INFORMATION:     PAC eval, right knee, DOS 22, Dr. Isabel.    Consult: ov     RECORDS REQUESTED FROM:       Primary Care Provider: Matt Brownlee    Most recent EKG+ Tracin20- [facility]    Most recent ECHO: 16- [place]    Most recent Cardiac Stress Test: 16- [place]

## 2022-11-16 NOTE — TELEPHONE ENCOUNTER
Scheduled surgery    ATC: please place PT order.       Type of surgery: right uni knee arthroplasty  Location of surgery: [facility]  Date and time of surgery: 12/20/22  Surgeon: Maame  Pre-Op Appt Date: 12/5/22  Post-Op Appt Date: 1/4/23   Packet sent out: Yes  Pre-cert/Authorization completed:  No  Date: 11/16/22      Chris Agustin, Surgery Scheduler

## 2022-11-17 ENCOUNTER — TELEPHONE (OUTPATIENT)
Dept: ORTHOPEDICS | Facility: CLINIC | Age: 52
End: 2022-11-17

## 2022-11-17 NOTE — TELEPHONE ENCOUNTER
[facility] Call Center    Phone Message    May a detailed message be left on voicemail: yes     Reason for Call: Other: Pt requesting work note for surg needs to include 1st day out and approx return date . Also needs to know if he needs to isolate before surg and for how long     Action Taken: Other: ortho csc    Travel Screening: Not Applicable

## 2022-11-17 NOTE — LETTER
November 18, 2022      Gibran Conner  [address] [address]        To Whom It May Concern,      Gibran Conner is a patient under my care. He is scheduled for knee surgery on 12/20/22 and will be off work for approximately 8 to 12 weeks depending on his recovery.     Sincerely,          Shabbir Isabel MD

## 2022-11-18 NOTE — TELEPHONE ENCOUNTER
Phone call to patient. He was informed to plan approximately 8 to 12 weeks off of work but he can go back sooner depending on his progress.   He is a  and does a lot of lifting, stairs, ladders, and snow removal. He is unsure if there is any light duty work available, but will ask.   Will fax letter to his attn: [phone] and he will watch for letter.     Letter faxed and confirmed it went through via legalPAD.     MARTIN Reddy RN

## 2022-12-05 ENCOUNTER — LAB (OUTPATIENT)
Dept: LAB | Facility: CLINIC | Age: 52
End: 2022-12-05
Payer: COMMERCIAL

## 2022-12-05 ENCOUNTER — OFFICE VISIT (OUTPATIENT)
Dept: SURGERY | Facility: CLINIC | Age: 52
End: 2022-12-05
Payer: COMMERCIAL

## 2022-12-05 ENCOUNTER — PRE VISIT (OUTPATIENT)
Dept: SURGERY | Facility: CLINIC | Age: 52
End: 2022-12-05

## 2022-12-05 ENCOUNTER — ANESTHESIA EVENT (OUTPATIENT)
Dept: SURGERY | Facility: CLINIC | Age: 52
End: 2022-12-05

## 2022-12-05 VITALS
OXYGEN SATURATION: 96 % | HEIGHT: 69 IN | DIASTOLIC BLOOD PRESSURE: 92 MMHG | WEIGHT: 284 LBS | RESPIRATION RATE: 24 BRPM | HEART RATE: 100 BPM | TEMPERATURE: 98.1 F | SYSTOLIC BLOOD PRESSURE: 153 MMHG | BODY MASS INDEX: 42.06 KG/M2

## 2022-12-05 DIAGNOSIS — Z01.818 PRE-OP EVALUATION: ICD-10-CM

## 2022-12-05 DIAGNOSIS — M17.11 PRIMARY OSTEOARTHRITIS OF RIGHT KNEE: Primary | ICD-10-CM

## 2022-12-05 DIAGNOSIS — E66.01 MORBID OBESITY (H): ICD-10-CM

## 2022-12-05 LAB
ANION GAP SERPL CALCULATED.3IONS-SCNC: 16 MMOL/L (ref 7–15)
BUN SERPL-MCNC: 13.1 MG/DL (ref 6–20)
CALCIUM SERPL-MCNC: 10.1 MG/DL (ref 8.6–10)
CHLORIDE SERPL-SCNC: 98 MMOL/L (ref 98–107)
CREAT SERPL-MCNC: 0.74 MG/DL (ref 0.67–1.17)
DEPRECATED HCO3 PLAS-SCNC: 23 MMOL/L (ref 22–29)
GFR SERPL CREATININE-BSD FRML MDRD: >90 ML/MIN/1.73M2
GLUCOSE SERPL-MCNC: 119 MG/DL (ref 70–99)
HBA1C MFR BLD: 5.8 %
POTASSIUM SERPL-SCNC: 5.3 MMOL/L (ref 3.4–5.3)
SODIUM SERPL-SCNC: 137 MMOL/L (ref 136–145)

## 2022-12-05 PROCEDURE — 99000 SPECIMEN HANDLING OFFICE-LAB: CPT | Performed by: PATHOLOGY

## 2022-12-05 PROCEDURE — 36415 COLL VENOUS BLD VENIPUNCTURE: CPT | Performed by: PATHOLOGY

## 2022-12-05 PROCEDURE — 83036 HEMOGLOBIN GLYCOSYLATED A1C: CPT | Mod: 90 | Performed by: PATHOLOGY

## 2022-12-05 PROCEDURE — 80048 BASIC METABOLIC PNL TOTAL CA: CPT | Performed by: PATHOLOGY

## 2022-12-05 RX ORDER — AMLODIPINE BESYLATE 5 MG/1
5 TABLET ORAL EVERY MORNING
COMMUNITY
Start: 2022-10-28

## 2022-12-05 ASSESSMENT — PAIN SCALES - GENERAL: PAINLEVEL: MILD PAIN (3)

## 2022-12-05 ASSESSMENT — LIFESTYLE VARIABLES: TOBACCO_USE: 1

## 2022-12-05 ASSESSMENT — ENCOUNTER SYMPTOMS: ORTHOPNEA: 0

## 2022-12-05 NOTE — H&P
Pre-Operative H & P     CC:  Preoperative exam to assess for increased cardiopulmonary risk while undergoing surgery and anesthesia.    Date of Encounter: 12/5/2022  Primary Care Physician:  Matt Brownlee     Reason for visit: Pre-operative evaluation    HPI  Gibran Conner is a 52 year old male who presents for pre-operative H & P in preparation for  Procedure Information     Date/Time: 11/20/2022     Procedure: Right and compartmental knee arthroplasty    Anesthesia type: Choice    Pre-op diagnosis: Primary osteoarthritis of right knee    Location: [facility]    Providers: Shabbir Isabel MD            Gibran Conner is a 52 year old male with a PM of HTN, DM2, GERD and ALEISHA.  He also has a history of chronic right knee pain that has been present over 1 year. Patient has a history of right medial meniscectomy.  Pain is noticed on the medial side of the knee.  The knee swells occasionally.  He describes the presence of crepitus. Patient reports the presence of night pain, initiation stiffness and soreness. He has trouble walking around the block currently and the pain is greatly decreasing his activity level which has led to an increase in weight.  His quality of life is significantly impaired.  He has trialed over-the-counter analgesics, activity modification, medial  bracing, physical therapy/home exercise regimen and intra-articular injection.  None of these have provided significant or long-term relief. He consulted with Dr. Isabel and presents today in preparation for the above recommended procedure.        History is obtained from the patient and chart review    Hx of abnormal bleeding or anti-platelet use: no      Past Medical History  Past Medical History:   Diagnosis Date     Diabetes (H) 08/2018    new DX     Hypertension      Sleep apnea     CPAP       Past Surgical History  Past Surgical History:   Procedure Laterality Date     ARTHROSCOPY SHOULDER, OPEN ROTATOR CUFF REPAIR,  COMBINED Right 2018    Procedure: COMBINED ARTHROSCOPY SHOULDER, OPEN ROTATOR CUFF REPAIR;  1.  Right shoulder arthroscopic subcoracoid decompression.   2.  Right shoulder arthroscopic subacromial bursectomy and limited debridement.   3.  Open subscapularis tendon repair, right shoulder.   4.  Open biceps tenodesis, right shoulder. ;  Surgeon: Cuauhtemoc Monroy MD;  Location: [facility] OR     BACK SURGERY       ORTHOPEDIC SURGERY Right 2013    shoulder     ORTHOPEDIC SURGERY      meniscus       Prior to Admission Medications  Current Outpatient Medications   Medication Sig Dispense Refill     amLODIPine (NORVASC) 5 MG tablet Take 5 mg by mouth every morning       aspirin (ASA) 81 MG chewable tablet Take 81 mg by mouth every morning       LISINOPRIL PO Take 40 mg by mouth every evening       metFORMIN (GLUCOPHAGE) 500 MG tablet Take 500 mg by mouth 2 times daily (with meals)       Multiple Vitamins-Minerals (MULTIVITAMIN ADULT PO) Take by mouth every morning         Allergies  No Known Allergies    Social History  Social History     Socioeconomic History     Marital status: Single     Spouse name: Not on file     Number of children: Not on file     Years of education: Not on file     Highest education level: Not on file   Occupational History     Not on file   Tobacco Use     Smoking status: Former     Packs/day: 1.00     Years: 30.00     Pack years: 30.00     Types: Cigarettes     Quit date:      Years since quittin.9     Smokeless tobacco: Never   Substance and Sexual Activity     Alcohol use: Yes     Comment: daily 3-4     Drug use: No     Sexual activity: Not on file   Other Topics Concern     Not on file   Social History Narrative     Not on file     Social Determinants of Health     Financial Resource Strain: Not on file   Food Insecurity: Not on file   Transportation Needs: Not on file   Physical Activity: Not on file   Stress: Not on file   Social Connections: Not on file   Intimate Partner Violence: Not  "on file   Housing Stability: Not on file       Family History  Family History   Problem Relation Age of Onset     Diabetes Mother      Cancer Mother      Heart Disease Father      Cerebrovascular Disease Father      Diabetes Father        Review of Systems  The complete review of systems is negative other than noted in the HPI or here.   Anesthesia Evaluation   Pt has had prior anesthetic. Type: General and MAC.    History of anesthetic complications   ALEISHA.    ROS/MED HX  ENT/Pulmonary:     (+) sleep apnea, uses CPAP, tobacco use, Past use,     Neurologic:  - neg neurologic ROS     Cardiovascular:     (+) hypertension-----Taking blood thinners Instructions Given to patient: hold 7 days prior to DOS. 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   Hematologic:       Musculoskeletal:   (+) arthritis,     GI/Hepatic:  - neg GI/hepatic ROS     Renal/Genitourinary:  - neg Renal ROS     Endo:     (+) type II DM, Obesity,     Psychiatric/Substance Use:  - neg psychiatric ROS     Infectious Disease:       Malignancy:       Other:      (+) , H/O Chronic Pain,        BP (!) 153/92 (BP Location: Right arm, Patient Position: Sitting, Cuff Size: Adult Large)   Pulse 100   Temp 98.1  F (36.7  C) (Oral)   Resp 24   Ht 1.753 m (5' 9\")   Wt 128.8 kg (284 lb)   SpO2 96%   BMI 41.94 kg/m      Physical Exam   Constitutional: Awake, alert, cooperative, no apparent distress, and appears stated age.  Eyes: Pupils equal, round and reactive to light, extra ocular muscles intact, sclera clear, conjunctiva normal.  HENT: Normocephalic, oral pharynx with moist mucus membranes, good dentition. No goiter appreciated.   Respiratory: Clear to auscultation bilaterally, no crackles or wheezing.  Cardiovascular: Regular rate and rhythm, normal S1 and S2, and no murmur noted.  Carotids +2, no bruits. No edema. Palpable pulses to " radial  DP and PT arteries.   GI: Normal bowel sounds, soft and non-tender. Unable to adequately assess for hepatosplenomegaly given obese abdomen. No superficial masses noted.   Lymph/Hematologic: No cervical lymphadenopathy and no supraclavicular lymphadenopathy.  Genitourinary:  deferred  Skin: Warm and dry.  No rashes at anticipated surgical site.   Musculoskeletal: Full ROM of neck. There is no redness, warmth, or swelling of the joints. Gross motor strength is normal.    Neurologic: Awake, alert, oriented to name, place and time. Cranial nerves II-XII are grossly intact. Gait is normal.   Neuropsychiatric: Calm, cooperative. Normal affect.     Prior Labs/Diagnostic Studies   All labs and imaging personally reviewed     EKG/ stress test - if available please see in ROS above   No results found.  No flowsheet data found.      The patient's records and results personally reviewed by this provider.     Outside records reviewed from: Care Everywhere    LAB/DIAGNOSTIC STUDIES TODAY:    Last Comprehensive Metabolic Panel:  Sodium   Date Value Ref Range Status   12/05/2022 137 136 - 145 mmol/L Final     Potassium   Date Value Ref Range Status   12/05/2022 5.3 3.4 - 5.3 mmol/L Final   08/14/2018 3.9 3.4 - 5.3 mmol/L Final     Chloride   Date Value Ref Range Status   12/05/2022 98 98 - 107 mmol/L Final     Carbon Dioxide (CO2)   Date Value Ref Range Status   12/05/2022 23 22 - 29 mmol/L Final     Anion Gap   Date Value Ref Range Status   12/05/2022 16 (H) 7 - 15 mmol/L Final     Glucose   Date Value Ref Range Status   12/05/2022 119 (H) 70 - 99 mg/dL Final     Urea Nitrogen   Date Value Ref Range Status   12/05/2022 13.1 6.0 - 20.0 mg/dL Final     Creatinine   Date Value Ref Range Status   12/05/2022 0.74 0.67 - 1.17 mg/dL Final   08/14/2018 0.62 (L) 0.66 - 1.25 mg/dL Final     GFR Estimate   Date Value Ref Range Status   12/05/2022 >90 >60 mL/min/1.73m2 Final     Comment:     Effective December 21, 2021 eGFRcr in  adults is calculated using the 2021 CKD-EPI creatinine equation which includes age and gender (Desirae orozco al., [place], DOI: 10.1056/NAOIap6495325)   08/14/2018 >90 >60 mL/min/1.7m2 Final     Comment:     Non  GFR Calc     Calcium   Date Value Ref Range Status   12/05/2022 10.1 (H) 8.6 - 10.0 mg/dL Final       Lab Results   Component Value Date    A1C 5.8 12/05/2022         Assessment      Gibran Conner is a 52 year old male seen as a PAC referral for risk assessment and optimization for anesthesia.    Plan/Recommendations  Pt will be optimized for the proposed procedure.  See below for details on the assessment, risk, and preoperative recommendations    NEUROLOGY  - No history of TIA, CVA or seizure    -Post Op delirium risk factors:  No risk identified    ENT  - No current airway concerns.  Will need to be reassessed day of surgery.  Mallampati: II  TM: < 3  Large neck     CARDIAC  - No history of CAD and Afib  - Hypertension- BP elevated in clinic today. Did not take his meds this morning.   Lisinopril ( hold dos) and amlodipine continue to take.   No anginal symptoms, Denies palpitations, PND, dizziness or syncope.     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 No anginal symptoms, Denies palpitations, PND, dizziness or syncope. '  Patient performs 4 or more METS exercise without symptoms            Total Score: 0      RCRI-Very low risk: Class 1 0.4% complication rate            Total Score: 0        PULMONARY  - Obstructive Sleep Apnea  ALEISHA with home CPAP.  Patient will be instructed to bring their home CPAP device to the hospital with them.    - Denies asthma or inhaler use  - Tobacco History      History   Smoking Status     Former     Packs/day: 1.00     Years: 30.00     Types: Cigarettes     Quit date: 2015   Smokeless Tobacco     Never       GI    PONV Medium Risk  Total Score: 2           1 AN PONV: Patient is  "not a current smoker    1 AN PONV: Intended Post Op Opioids        /RENAL  - Baseline Creatinine      ENDOCRINE    - BMI: Estimated body mass index is 41.94 kg/m  as calculated from the following:    Height as of this encounter: 1.753 m (5' 9\").    Weight as of this encounter: 128.8 kg (284 lb).  Class 3 Obesity (BMI > 40)  - Diabetes  Lab Results   Component Value Date    A1C 5.8 12/05/2022       Diabetes Mellitus, Type 2, non-insulin dependent.  Hold morning oral hypoglycemic medications. Recommend close monitoring of the patient's blood glucose levels throughout the perioperative period.    HEME  VTE Low Risk 0.5%            Total Score: 3    VTE: BMI greater than 39    VTE: Male      - Platelet disfunction second to Aspirin ([place], many others)  Hold 7 days prior to DOS    MSK  Osteoarthritis of left knee Procedure scheduled as above.   Hx of Torn Meniscus left knee    Patient is NOT Frail            Total Score: 0            Different anesthesia methods/types have been discussed with the patient, but they are aware that the final plan will be decided by the assigned anesthesia provider on the date of service.    The patient is optimized for their procedure. AVS with information on surgery time/arrival time, meds and NPO status given by nursing staff. No further diagnostic testing indicated.      On the day of service:     Prep time: 15 minutes  Visit time: 15 minutes  Documentation time: 10 minutes  ------------------------------------------  Total time: 40 minutes      ABI Kay CNP  Preoperative Assessment Center  [facility]  Clinic and Surgery Center  Phone: [phone]  Fax: [phone]  "

## 2022-12-09 NOTE — PROGRESS NOTES
12/09/22 1524   Discharge Planning   Concerns to be Addressed no discharge needs identified;denies needs/concerns at this time   Living Arrangements   Is your private residence a single family home or apartment? Single family home   Number of Stairs, Within Home, Primary greater than 10 stairs   Stair Railings, Within Home, Primary railings safe and in good condition   Once home, are you able to live on one level? Yes   Which level? Main Level   Bathroom Shower/Tub Walk-in shower   Equipment Currently Used at Home grab bar, toilet;grab bar, tub/shower   Support System   Do you have someone available to stay with you one or two nights once you are home? Yes  (parents)   Medical Clearance   It is recommended that you call and check with any specialty providers before surgery to see if you need surgical clearance.  Do you see any specialty providers outside of your primary care provider? No   Blood   Known bleeding disorder or coagulopathy? No   Does the patient have any Church/cultural preferences related to blood products? No   Education   Has the patient scheduled or completed pre-op total joint education, either in class or online, in the last 12 months? No

## 2022-12-13 NOTE — PROGRESS NOTES
Ortho Navigator Note      Pre-op Date 12/5/22     Medical Clearance  Cleared      Labs WNL     COVID Test Date No longer indicated     Skin  Intact      Activity: Ambulates independently      Equipment Need Patient will likely need a walker for discharge. Defer to PT/OT for recs.       Meds to Hold Held all supplements 14 days prior to surgery  Hold ASA 7 days prior to surgery   Hold Lisinopril and Metformin DOS      NPO Instructions  Defer to PAN RN     Pre-op Joint Education Complete? Completed by phone. Opted out of online education.    Discharge Plan Patient has plan to discharge home on DOS per pts conversation with Dr. Isabel. Parents will transport patient home    /Transportation Parents will be transportation and coaches.  Parents are physically able to care for patient.      Barriers to Discharge No barriers to discharge.      Additional Info/   Special Needs : Patient had no unanswered questions or concerns.              12/09/22 1524   Discharge Planning   Patient/Family Anticipates Transition to family members' home   Concerns to be Addressed no discharge needs identified;denies needs/concerns at this time   Living Arrangements   People in Home alone   Type of Residence Private Residence   Is your private residence a single family home or apartment? Single family home   Number of Stairs, Within Home, Primary greater than 10 stairs   Stair Railings, Within Home, Primary railings safe and in good condition   Once home, are you able to live on one level? Yes   Which level? Main Level   Bathroom Shower/Tub Walk-in shower   Equipment Currently Used at Home grab bar, toilet;grab bar, tub/shower   Support System   Do you have someone available to stay with you one or two nights once you are home? Yes  (parents)   Medical Clearance   Clinic Name PAC   It is recommended that you call and check with any specialty providers before surgery to see if you need surgical clearance.  Do you see any  specialty providers outside of your primary care provider? No   Blood   Known bleeding disorder or coagulopathy? No   Does the patient have any Yazidism/cultural preferences related to blood products? No   Education   Has the patient scheduled or completed pre-op total joint education, either in class or online, in the last 12 months? No   Patient attended total joint pre-op class/received pre-op teaching  email/phone call

## 2022-12-19 NOTE — PHARMACY-ADMISSION MEDICATION HISTORY
Medication reconciliation interview completed by pre-admitting nurse     Reviewed by Inés Musa, RN (Registered Nurse) on 12/09/22 at 1514      Reviewed by pharmacy. No further clarifications needed.       Prior to Admission medications    Medication Sig Last Dose Taking? Auth Provider Long Term End Date   amLODIPine (NORVASC) 5 MG tablet Take 5 mg by mouth every morning  Yes Reported, Patient Yes    aspirin (ASA) 81 MG chewable tablet Take 81 mg by mouth every morning  Yes Reported, Patient     LISINOPRIL PO Take 40 mg by mouth every evening  Yes Reported, Patient Yes    metFORMIN (GLUCOPHAGE) 500 MG tablet Take 500 mg by mouth 2 times daily (with meals)  Yes Reported, Patient Yes    Multiple Vitamins-Minerals (MULTIVITAMIN ADULT PO) Take by mouth every morning  Yes Reported, Patient

## 2022-12-20 ENCOUNTER — ANESTHESIA (OUTPATIENT)
Dept: SURGERY | Facility: CLINIC | Age: 52
End: 2022-12-20
Payer: COMMERCIAL

## 2022-12-20 ENCOUNTER — APPOINTMENT (OUTPATIENT)
Dept: GENERAL RADIOLOGY | Facility: CLINIC | Age: 52
End: 2022-12-20
Attending: STUDENT IN AN ORGANIZED HEALTH CARE EDUCATION/TRAINING PROGRAM
Payer: COMMERCIAL

## 2022-12-20 ENCOUNTER — HOSPITAL ENCOUNTER (OUTPATIENT)
Facility: CLINIC | Age: 52
Discharge: HOME | End: 2022-12-21
Attending: STUDENT IN AN ORGANIZED HEALTH CARE EDUCATION/TRAINING PROGRAM | Admitting: STUDENT IN AN ORGANIZED HEALTH CARE EDUCATION/TRAINING PROGRAM
Payer: COMMERCIAL

## 2022-12-20 ENCOUNTER — ANESTHESIA EVENT (OUTPATIENT)
Dept: SURGERY | Facility: CLINIC | Age: 52
End: 2022-12-20
Payer: COMMERCIAL

## 2022-12-20 ENCOUNTER — APPOINTMENT (OUTPATIENT)
Dept: PHYSICAL THERAPY | Facility: CLINIC | Age: 52
End: 2022-12-20
Attending: STUDENT IN AN ORGANIZED HEALTH CARE EDUCATION/TRAINING PROGRAM
Payer: COMMERCIAL

## 2022-12-20 ENCOUNTER — TELEPHONE (OUTPATIENT)
Dept: ORTHOPEDICS | Facility: CLINIC | Age: 52
End: 2022-12-20

## 2022-12-20 DIAGNOSIS — M17.11 PRIMARY OSTEOARTHRITIS OF RIGHT KNEE: ICD-10-CM

## 2022-12-20 LAB
GLUCOSE BLDC GLUCOMTR-MCNC: 136 MG/DL (ref 70–99)
GLUCOSE BLDC GLUCOMTR-MCNC: 191 MG/DL (ref 70–99)
GLUCOSE BLDC GLUCOMTR-MCNC: 205 MG/DL (ref 70–99)

## 2022-12-20 PROCEDURE — 360N000077 HC SURGERY LEVEL 4, PER MIN: Performed by: STUDENT IN AN ORGANIZED HEALTH CARE EDUCATION/TRAINING PROGRAM

## 2022-12-20 PROCEDURE — 258N000001 HC RX 258: Performed by: STUDENT IN AN ORGANIZED HEALTH CARE EDUCATION/TRAINING PROGRAM

## 2022-12-20 PROCEDURE — 82962 GLUCOSE BLOOD TEST: CPT

## 2022-12-20 PROCEDURE — C1713 ANCHOR/SCREW BN/BN,TIS/BN: HCPCS | Performed by: STUDENT IN AN ORGANIZED HEALTH CARE EDUCATION/TRAINING PROGRAM

## 2022-12-20 PROCEDURE — 250N000011 HC RX IP 250 OP 636: Performed by: STUDENT IN AN ORGANIZED HEALTH CARE EDUCATION/TRAINING PROGRAM

## 2022-12-20 PROCEDURE — 27446 REVISION OF KNEE JOINT: CPT | Mod: RT | Performed by: STUDENT IN AN ORGANIZED HEALTH CARE EDUCATION/TRAINING PROGRAM

## 2022-12-20 PROCEDURE — 710N000009 HC RECOVERY PHASE 1, LEVEL 1, PER MIN: Performed by: STUDENT IN AN ORGANIZED HEALTH CARE EDUCATION/TRAINING PROGRAM

## 2022-12-20 PROCEDURE — 250N000009 HC RX 250: Performed by: STUDENT IN AN ORGANIZED HEALTH CARE EDUCATION/TRAINING PROGRAM

## 2022-12-20 PROCEDURE — 250N000013 HC RX MED GY IP 250 OP 250 PS 637: Performed by: STUDENT IN AN ORGANIZED HEALTH CARE EDUCATION/TRAINING PROGRAM

## 2022-12-20 PROCEDURE — 97530 THERAPEUTIC ACTIVITIES: CPT | Mod: GP | Performed by: PHYSICAL THERAPIST

## 2022-12-20 PROCEDURE — 999N000141 HC STATISTIC PRE-PROCEDURE NURSING ASSESSMENT: Performed by: STUDENT IN AN ORGANIZED HEALTH CARE EDUCATION/TRAINING PROGRAM

## 2022-12-20 PROCEDURE — 370N000017 HC ANESTHESIA TECHNICAL FEE, PER MIN: Performed by: STUDENT IN AN ORGANIZED HEALTH CARE EDUCATION/TRAINING PROGRAM

## 2022-12-20 PROCEDURE — 250N000013 HC RX MED GY IP 250 OP 250 PS 637: Performed by: ANESTHESIOLOGY

## 2022-12-20 PROCEDURE — 97161 PT EVAL LOW COMPLEX 20 MIN: CPT | Mod: GP | Performed by: PHYSICAL THERAPIST

## 2022-12-20 PROCEDURE — 97110 THERAPEUTIC EXERCISES: CPT | Mod: GP | Performed by: PHYSICAL THERAPIST

## 2022-12-20 PROCEDURE — 250N000011 HC RX IP 250 OP 636: Performed by: NURSE ANESTHETIST, CERTIFIED REGISTERED

## 2022-12-20 PROCEDURE — 258N000003 HC RX IP 258 OP 636: Performed by: NURSE ANESTHETIST, CERTIFIED REGISTERED

## 2022-12-20 PROCEDURE — 97116 GAIT TRAINING THERAPY: CPT | Mod: GP | Performed by: PHYSICAL THERAPIST

## 2022-12-20 PROCEDURE — 258N000003 HC RX IP 258 OP 636: Performed by: STUDENT IN AN ORGANIZED HEALTH CARE EDUCATION/TRAINING PROGRAM

## 2022-12-20 PROCEDURE — 272N000001 HC OR GENERAL SUPPLY STERILE: Performed by: STUDENT IN AN ORGANIZED HEALTH CARE EDUCATION/TRAINING PROGRAM

## 2022-12-20 PROCEDURE — C1776 JOINT DEVICE (IMPLANTABLE): HCPCS | Performed by: STUDENT IN AN ORGANIZED HEALTH CARE EDUCATION/TRAINING PROGRAM

## 2022-12-20 PROCEDURE — 250N000009 HC RX 250: Performed by: NURSE ANESTHETIST, CERTIFIED REGISTERED

## 2022-12-20 PROCEDURE — 999N000065 XR KNEE PORT RIGHT 1/2 VIEWS: Mod: RT

## 2022-12-20 DEVICE — KNEE OXFORD UNI FEMORAL MED: Type: IMPLANTABLE DEVICE | Site: KNEE | Status: FUNCTIONAL

## 2022-12-20 DEVICE — IMPLANTABLE DEVICE: Type: IMPLANTABLE DEVICE | Site: KNEE | Status: FUNCTIONAL

## 2022-12-20 DEVICE — BONE CEMENT SIMPLEX FULL DOSE 6191-1-001: Type: IMPLANTABLE DEVICE | Site: KNEE | Status: FUNCTIONAL

## 2022-12-20 RX ORDER — TRANEXAMIC ACID 650 MG/1
1950 TABLET ORAL ONCE
Status: COMPLETED | OUTPATIENT
Start: 2022-12-20 | End: 2022-12-20

## 2022-12-20 RX ORDER — HYDROMORPHONE HCL IN WATER/PF 6 MG/30 ML
0.2 PATIENT CONTROLLED ANALGESIA SYRINGE INTRAVENOUS
Status: DISCONTINUED | OUTPATIENT
Start: 2022-12-20 | End: 2022-12-21 | Stop reason: HOSPADM

## 2022-12-20 RX ORDER — LABETALOL HYDROCHLORIDE 5 MG/ML
10 INJECTION, SOLUTION INTRAVENOUS
Status: DISCONTINUED | OUTPATIENT
Start: 2022-12-20 | End: 2022-12-20 | Stop reason: HOSPADM

## 2022-12-20 RX ORDER — NEOSTIGMINE METHYLSULFATE 1 MG/ML
VIAL (ML) INJECTION PRN
Status: DISCONTINUED | OUTPATIENT
Start: 2022-12-20 | End: 2022-12-20

## 2022-12-20 RX ORDER — ASPIRIN 81 MG/1
81 TABLET ORAL 2 TIMES DAILY
Status: DISCONTINUED | OUTPATIENT
Start: 2022-12-20 | End: 2022-12-21 | Stop reason: HOSPADM

## 2022-12-20 RX ORDER — SODIUM CHLORIDE, SODIUM LACTATE, POTASSIUM CHLORIDE, CALCIUM CHLORIDE 600; 310; 30; 20 MG/100ML; MG/100ML; MG/100ML; MG/100ML
INJECTION, SOLUTION INTRAVENOUS CONTINUOUS
Status: DISCONTINUED | OUTPATIENT
Start: 2022-12-20 | End: 2022-12-20 | Stop reason: HOSPADM

## 2022-12-20 RX ORDER — OXYCODONE HYDROCHLORIDE 5 MG/1
5-10 TABLET ORAL EVERY 4 HOURS PRN
Qty: 26 TABLET | Refills: 0 | Status: SHIPPED | OUTPATIENT
Start: 2022-12-20 | End: 2023-01-13 | Stop reason: ALTCHOICE

## 2022-12-20 RX ORDER — LIDOCAINE 40 MG/G
CREAM TOPICAL
Status: DISCONTINUED | OUTPATIENT
Start: 2022-12-20 | End: 2022-12-20 | Stop reason: HOSPADM

## 2022-12-20 RX ORDER — PROCHLORPERAZINE MALEATE 5 MG
10 TABLET ORAL EVERY 6 HOURS PRN
Status: DISCONTINUED | OUTPATIENT
Start: 2022-12-20 | End: 2022-12-21 | Stop reason: HOSPADM

## 2022-12-20 RX ORDER — ONDANSETRON 4 MG/1
4 TABLET, ORALLY DISINTEGRATING ORAL EVERY 6 HOURS PRN
Status: DISCONTINUED | OUTPATIENT
Start: 2022-12-20 | End: 2022-12-21 | Stop reason: HOSPADM

## 2022-12-20 RX ORDER — ONDANSETRON 2 MG/ML
4 INJECTION INTRAMUSCULAR; INTRAVENOUS EVERY 6 HOURS PRN
Status: DISCONTINUED | OUTPATIENT
Start: 2022-12-20 | End: 2022-12-21 | Stop reason: HOSPADM

## 2022-12-20 RX ORDER — NALOXONE HYDROCHLORIDE 0.4 MG/ML
0.4 INJECTION, SOLUTION INTRAMUSCULAR; INTRAVENOUS; SUBCUTANEOUS
Status: DISCONTINUED | OUTPATIENT
Start: 2022-12-20 | End: 2022-12-21 | Stop reason: HOSPADM

## 2022-12-20 RX ORDER — ONDANSETRON 4 MG/1
4 TABLET, ORALLY DISINTEGRATING ORAL EVERY 30 MIN PRN
Status: DISCONTINUED | OUTPATIENT
Start: 2022-12-20 | End: 2022-12-20 | Stop reason: HOSPADM

## 2022-12-20 RX ORDER — DEXAMETHASONE SODIUM PHOSPHATE 4 MG/ML
INJECTION, SOLUTION INTRA-ARTICULAR; INTRALESIONAL; INTRAMUSCULAR; INTRAVENOUS; SOFT TISSUE PRN
Status: DISCONTINUED | OUTPATIENT
Start: 2022-12-20 | End: 2022-12-20

## 2022-12-20 RX ORDER — FENTANYL CITRATE 50 UG/ML
25 INJECTION, SOLUTION INTRAMUSCULAR; INTRAVENOUS EVERY 5 MIN PRN
Status: DISCONTINUED | OUTPATIENT
Start: 2022-12-20 | End: 2022-12-20 | Stop reason: HOSPADM

## 2022-12-20 RX ORDER — NALOXONE HYDROCHLORIDE 0.4 MG/ML
0.2 INJECTION, SOLUTION INTRAMUSCULAR; INTRAVENOUS; SUBCUTANEOUS
Status: DISCONTINUED | OUTPATIENT
Start: 2022-12-20 | End: 2022-12-21 | Stop reason: HOSPADM

## 2022-12-20 RX ORDER — LIDOCAINE 40 MG/G
CREAM TOPICAL
Status: DISCONTINUED | OUTPATIENT
Start: 2022-12-20 | End: 2022-12-21 | Stop reason: HOSPADM

## 2022-12-20 RX ORDER — GLYCOPYRROLATE 0.2 MG/ML
INJECTION, SOLUTION INTRAMUSCULAR; INTRAVENOUS PRN
Status: DISCONTINUED | OUTPATIENT
Start: 2022-12-20 | End: 2022-12-20

## 2022-12-20 RX ORDER — CEFAZOLIN SODIUM/WATER 3 G/30 ML
3 SYRINGE (ML) INTRAVENOUS SEE ADMIN INSTRUCTIONS
Status: DISCONTINUED | OUTPATIENT
Start: 2022-12-20 | End: 2022-12-20 | Stop reason: HOSPADM

## 2022-12-20 RX ORDER — OXYCODONE HYDROCHLORIDE 5 MG/1
5 TABLET ORAL EVERY 4 HOURS PRN
Status: DISCONTINUED | OUTPATIENT
Start: 2022-12-20 | End: 2022-12-21 | Stop reason: HOSPADM

## 2022-12-20 RX ORDER — ALBUTEROL SULFATE 0.83 MG/ML
2.5 SOLUTION RESPIRATORY (INHALATION) EVERY 4 HOURS PRN
Status: DISCONTINUED | OUTPATIENT
Start: 2022-12-20 | End: 2022-12-20 | Stop reason: HOSPADM

## 2022-12-20 RX ORDER — AMOXICILLIN 250 MG
1-2 CAPSULE ORAL 2 TIMES DAILY
Qty: 30 TABLET | Refills: 0 | Status: SHIPPED | OUTPATIENT
Start: 2022-12-20

## 2022-12-20 RX ORDER — HYDROMORPHONE HCL IN WATER/PF 6 MG/30 ML
0.4 PATIENT CONTROLLED ANALGESIA SYRINGE INTRAVENOUS
Status: DISCONTINUED | OUTPATIENT
Start: 2022-12-20 | End: 2022-12-21 | Stop reason: HOSPADM

## 2022-12-20 RX ORDER — CEFAZOLIN SODIUM 2 G/100ML
2 INJECTION, SOLUTION INTRAVENOUS EVERY 8 HOURS
Status: COMPLETED | OUTPATIENT
Start: 2022-12-20 | End: 2022-12-21

## 2022-12-20 RX ORDER — PROPOFOL 10 MG/ML
INJECTION, EMULSION INTRAVENOUS PRN
Status: DISCONTINUED | OUTPATIENT
Start: 2022-12-20 | End: 2022-12-20

## 2022-12-20 RX ORDER — ACETAMINOPHEN 325 MG/1
650 TABLET ORAL EVERY 4 HOURS PRN
Qty: 100 TABLET | Refills: 0 | Status: SHIPPED | OUTPATIENT
Start: 2022-12-20

## 2022-12-20 RX ORDER — POLYETHYLENE GLYCOL 3350 17 G/17G
17 POWDER, FOR SOLUTION ORAL DAILY
Status: DISCONTINUED | OUTPATIENT
Start: 2022-12-21 | End: 2022-12-21 | Stop reason: HOSPADM

## 2022-12-20 RX ORDER — METHADONE HYDROCHLORIDE 10 MG/ML
2 INJECTION, SOLUTION INTRAMUSCULAR; INTRAVENOUS; SUBCUTANEOUS 3 TIMES DAILY PRN
Status: DISCONTINUED | OUTPATIENT
Start: 2022-12-20 | End: 2022-12-20 | Stop reason: HOSPADM

## 2022-12-20 RX ORDER — CEFAZOLIN SODIUM/WATER 3 G/30 ML
3 SYRINGE (ML) INTRAVENOUS
Status: COMPLETED | OUTPATIENT
Start: 2022-12-20 | End: 2022-12-20

## 2022-12-20 RX ORDER — KETOROLAC TROMETHAMINE 15 MG/ML
15 INJECTION, SOLUTION INTRAMUSCULAR; INTRAVENOUS EVERY 6 HOURS
Status: COMPLETED | OUTPATIENT
Start: 2022-12-20 | End: 2022-12-21

## 2022-12-20 RX ORDER — METHADONE HYDROCHLORIDE 10 MG/ML
INJECTION, SOLUTION INTRAMUSCULAR; INTRAVENOUS; SUBCUTANEOUS PRN
Status: DISCONTINUED | OUTPATIENT
Start: 2022-12-20 | End: 2022-12-20

## 2022-12-20 RX ORDER — ASPIRIN 81 MG/1
81 TABLET ORAL 2 TIMES DAILY
Qty: 60 TABLET | Refills: 0 | Status: SHIPPED | OUTPATIENT
Start: 2022-12-20

## 2022-12-20 RX ORDER — ONDANSETRON 2 MG/ML
INJECTION INTRAMUSCULAR; INTRAVENOUS PRN
Status: DISCONTINUED | OUTPATIENT
Start: 2022-12-20 | End: 2022-12-20

## 2022-12-20 RX ORDER — OXYCODONE HYDROCHLORIDE 10 MG/1
10 TABLET ORAL EVERY 4 HOURS PRN
Status: DISCONTINUED | OUTPATIENT
Start: 2022-12-20 | End: 2022-12-21 | Stop reason: HOSPADM

## 2022-12-20 RX ORDER — POLYETHYLENE GLYCOL 3350 17 G/17G
1 POWDER, FOR SOLUTION ORAL DAILY
Qty: 7 PACKET | Refills: 0 | Status: SHIPPED | OUTPATIENT
Start: 2022-12-20

## 2022-12-20 RX ORDER — SODIUM CHLORIDE, SODIUM LACTATE, POTASSIUM CHLORIDE, CALCIUM CHLORIDE 600; 310; 30; 20 MG/100ML; MG/100ML; MG/100ML; MG/100ML
INJECTION, SOLUTION INTRAVENOUS CONTINUOUS
Status: DISCONTINUED | OUTPATIENT
Start: 2022-12-20 | End: 2022-12-21 | Stop reason: HOSPADM

## 2022-12-20 RX ORDER — ACETAMINOPHEN 325 MG/1
975 TABLET ORAL EVERY 8 HOURS
Status: DISCONTINUED | OUTPATIENT
Start: 2022-12-20 | End: 2022-12-21 | Stop reason: HOSPADM

## 2022-12-20 RX ORDER — FENTANYL CITRATE 50 UG/ML
25 INJECTION, SOLUTION INTRAMUSCULAR; INTRAVENOUS
Status: DISCONTINUED | OUTPATIENT
Start: 2022-12-20 | End: 2022-12-20 | Stop reason: HOSPADM

## 2022-12-20 RX ORDER — HYDRALAZINE HYDROCHLORIDE 20 MG/ML
10 INJECTION INTRAMUSCULAR; INTRAVENOUS EVERY 10 MIN PRN
Status: DISCONTINUED | OUTPATIENT
Start: 2022-12-20 | End: 2022-12-20 | Stop reason: HOSPADM

## 2022-12-20 RX ORDER — BISACODYL 10 MG
10 SUPPOSITORY, RECTAL RECTAL DAILY PRN
Status: DISCONTINUED | OUTPATIENT
Start: 2022-12-20 | End: 2022-12-21 | Stop reason: HOSPADM

## 2022-12-20 RX ORDER — IBUPROFEN 600 MG/1
600 TABLET, FILM COATED ORAL EVERY 6 HOURS PRN
Qty: 30 TABLET | Refills: 0 | Status: SHIPPED | OUTPATIENT
Start: 2022-12-20

## 2022-12-20 RX ORDER — SODIUM CHLORIDE, SODIUM LACTATE, POTASSIUM CHLORIDE, CALCIUM CHLORIDE 600; 310; 30; 20 MG/100ML; MG/100ML; MG/100ML; MG/100ML
INJECTION, SOLUTION INTRAVENOUS CONTINUOUS PRN
Status: DISCONTINUED | OUTPATIENT
Start: 2022-12-20 | End: 2022-12-20

## 2022-12-20 RX ORDER — ONDANSETRON 2 MG/ML
4 INJECTION INTRAMUSCULAR; INTRAVENOUS EVERY 30 MIN PRN
Status: DISCONTINUED | OUTPATIENT
Start: 2022-12-20 | End: 2022-12-20 | Stop reason: HOSPADM

## 2022-12-20 RX ORDER — AMOXICILLIN 250 MG
1 CAPSULE ORAL 2 TIMES DAILY
Status: DISCONTINUED | OUTPATIENT
Start: 2022-12-20 | End: 2022-12-21 | Stop reason: HOSPADM

## 2022-12-20 RX ORDER — ACETAMINOPHEN 325 MG/1
975 TABLET ORAL ONCE
Status: COMPLETED | OUTPATIENT
Start: 2022-12-20 | End: 2022-12-20

## 2022-12-20 RX ORDER — LIDOCAINE HYDROCHLORIDE 10 MG/ML
INJECTION, SOLUTION INFILTRATION; PERINEURAL PRN
Status: DISCONTINUED | OUTPATIENT
Start: 2022-12-20 | End: 2022-12-20

## 2022-12-20 RX ORDER — ACETAMINOPHEN 325 MG/1
650 TABLET ORAL EVERY 4 HOURS PRN
Status: DISCONTINUED | OUTPATIENT
Start: 2022-12-23 | End: 2022-12-21 | Stop reason: HOSPADM

## 2022-12-20 RX ADMIN — OXYCODONE HYDROCHLORIDE 5 MG: 5 TABLET ORAL at 16:39

## 2022-12-20 RX ADMIN — ASPIRIN 81 MG: 81 TABLET, COATED ORAL at 12:25

## 2022-12-20 RX ADMIN — DEXAMETHASONE SODIUM PHOSPHATE 8 MG: 4 INJECTION, SOLUTION INTRA-ARTICULAR; INTRALESIONAL; INTRAMUSCULAR; INTRAVENOUS; SOFT TISSUE at 07:36

## 2022-12-20 RX ADMIN — METHADONE HYDROCHLORIDE 15 MG: 10 INJECTION, SOLUTION INTRAMUSCULAR; INTRAVENOUS; SUBCUTANEOUS at 07:36

## 2022-12-20 RX ADMIN — OXYCODONE HYDROCHLORIDE 5 MG: 5 TABLET ORAL at 10:00

## 2022-12-20 RX ADMIN — GLYCOPYRROLATE 0.2 MG: 0.2 INJECTION, SOLUTION INTRAMUSCULAR; INTRAVENOUS at 07:56

## 2022-12-20 RX ADMIN — PROPOFOL 200 MG: 10 INJECTION, EMULSION INTRAVENOUS at 07:36

## 2022-12-20 RX ADMIN — KETOROLAC TROMETHAMINE 15 MG: 15 INJECTION, SOLUTION INTRAMUSCULAR; INTRAVENOUS at 23:48

## 2022-12-20 RX ADMIN — KETOROLAC TROMETHAMINE 15 MG: 15 INJECTION, SOLUTION INTRAMUSCULAR; INTRAVENOUS at 17:34

## 2022-12-20 RX ADMIN — CEFAZOLIN SODIUM 2 G: 2 INJECTION, SOLUTION INTRAVENOUS at 23:50

## 2022-12-20 RX ADMIN — Medication 100 MG: at 07:40

## 2022-12-20 RX ADMIN — LIDOCAINE HYDROCHLORIDE 50 MG: 10 INJECTION, SOLUTION INFILTRATION; PERINEURAL at 07:36

## 2022-12-20 RX ADMIN — TRANEXAMIC ACID 1950 MG: 650 TABLET ORAL at 06:05

## 2022-12-20 RX ADMIN — ONDANSETRON HYDROCHLORIDE 4 MG: 2 INJECTION, SOLUTION INTRAVENOUS at 08:57

## 2022-12-20 RX ADMIN — SENNOSIDES AND DOCUSATE SODIUM 1 TABLET: 50; 8.6 TABLET ORAL at 12:25

## 2022-12-20 RX ADMIN — CEFAZOLIN SODIUM 2 G: 2 INJECTION, SOLUTION INTRAVENOUS at 17:23

## 2022-12-20 RX ADMIN — SODIUM CHLORIDE, POTASSIUM CHLORIDE, SODIUM LACTATE AND CALCIUM CHLORIDE: 600; 310; 30; 20 INJECTION, SOLUTION INTRAVENOUS at 08:16

## 2022-12-20 RX ADMIN — ACETAMINOPHEN 975 MG: 325 TABLET, FILM COATED ORAL at 13:58

## 2022-12-20 RX ADMIN — DEXMEDETOMIDINE HYDROCHLORIDE 0.8 MCG/KG/HR: 100 INJECTION, SOLUTION INTRAVENOUS at 07:43

## 2022-12-20 RX ADMIN — ROCURONIUM BROMIDE 50 MG: 50 INJECTION, SOLUTION INTRAVENOUS at 07:56

## 2022-12-20 RX ADMIN — NEOSTIGMINE METHYLSULFATE 3 MG: 1 INJECTION, SOLUTION INTRAVENOUS at 08:53

## 2022-12-20 RX ADMIN — Medication 3 G: at 07:35

## 2022-12-20 RX ADMIN — ACETAMINOPHEN 975 MG: 325 TABLET, FILM COATED ORAL at 06:51

## 2022-12-20 RX ADMIN — GLYCOPYRROLATE 0.6 MG: 0.2 INJECTION, SOLUTION INTRAMUSCULAR; INTRAVENOUS at 08:53

## 2022-12-20 RX ADMIN — SENNOSIDES AND DOCUSATE SODIUM 1 TABLET: 50; 8.6 TABLET ORAL at 20:22

## 2022-12-20 RX ADMIN — MIDAZOLAM 2 MG: 1 INJECTION INTRAMUSCULAR; INTRAVENOUS at 07:32

## 2022-12-20 RX ADMIN — ASPIRIN 81 MG: 81 TABLET, COATED ORAL at 20:21

## 2022-12-20 RX ADMIN — ACETAMINOPHEN 975 MG: 325 TABLET, FILM COATED ORAL at 22:17

## 2022-12-20 RX ADMIN — SODIUM CHLORIDE, POTASSIUM CHLORIDE, SODIUM LACTATE AND CALCIUM CHLORIDE: 600; 310; 30; 20 INJECTION, SOLUTION INTRAVENOUS at 11:43

## 2022-12-20 RX ADMIN — SODIUM CHLORIDE, POTASSIUM CHLORIDE, SODIUM LACTATE AND CALCIUM CHLORIDE: 600; 310; 30; 20 INJECTION, SOLUTION INTRAVENOUS at 07:01

## 2022-12-20 ASSESSMENT — ACTIVITIES OF DAILY LIVING (ADL)
ADLS_ACUITY_SCORE: 20

## 2022-12-20 ASSESSMENT — LIFESTYLE VARIABLES: TOBACCO_USE: 1

## 2022-12-20 NOTE — PLAN OF CARE
Physical Therapy Discharge Summary    Reason for therapy discharge:    Discharged to home with outpatient therapy.    Progress towards therapy goal(s). See goals on Care Plan in [facility] electronic health record for goal details.  Goals met    Therapy recommendation(s):    Continued therapy is recommended.  Rationale/Recommendations:  OP PT to maximize return to PLOF; assist from parents until return to PLOF.

## 2022-12-20 NOTE — PLAN OF CARE
Patient vital signs are at baseline: Yes  Patient able to ambulate as they were prior to admission or with assist devices provided by therapies during their stay:  No,  Reason:  Ax2 with walker and gait belt.  Patient MUST void prior to discharge:  No,  Reason:  Bladder scan - 325cc.   Patient able to tolerate oral intake:  Yes  Pain has adequate pain control using Oral analgesics:  Yes  Does patient have an identified :  Yes  Has goal D/C date and time been discussed with patient:  Yes     A&Ox4. VSS. 02 - 92% on RA. LS - clear.  Blood sugar - 205.   Bladder scanned for 325 - due to void.   No IV access. IV came out. Paged for replacement.

## 2022-12-20 NOTE — TELEPHONE ENCOUNTER
"Partial TKA should be same day discharge, \"not fast track\".   Would page provider to give further clarity,  [phone].     Per Colette, patient does not seem able to discharge today, and will need orders for his diabetic medication if he stays overnight.     Writer provided pager number for provider to coordinate discharge/ place orders for admit.     Maddi Lyon, ATC          "

## 2022-12-20 NOTE — OP NOTE
[facility]    Operative Note    Pre-operative diagnosis: 52-year-old male presenting with chronic right knee pain due to end-stage osteoarthritic changes in medial compartment.  Insufficiently responding to nonsurgical treatment options.  Post-operative diagnosis Same as pre-operative diagnosis    Procedure: Procedure(s):  Right unicompartmental total knee arthroplasty  Surgeon: Surgeon(s) and Role:     * Shabbir Isabel MD - Primary  Anesthesia: General   Estimated Blood Loss: 200 ml    Drains: None  Specimens: * No specimens in log *    Complications: None.  Implants:   Implant Name Type Inv. Item Serial No.  Lot No. LRB No. Used Action   BONE CEMENT SIMPLEX FULL DOSE 6191-1-001 - [id number] Cement, Bone BONE CEMENT SIMPLEX FULL DOSE 6191-1-001  [employer] [id number] Right 1 Implanted   KNEE UNI TIBIA TRAY MOBILE BEAR D5 RM/LL - [id number] Total Joint Component/Insert KNEE UNI TIBIA TRAY MOBILE BEAR D5 RM/LL  [employer] [id number] Right 1 Implanted   KNEE [place] UNI FEMORAL MED - [id number] Total Joint Component/Insert KNEE [place]ORD UNI FEMORAL MED  [employer] [id number] Right 1 Implanted   INSERT OX[place]ORD ANATOMIC BEAR R MED SZ 4 - [id number] Total Joint Component/Insert INSERT OXFORD ANATOMIC BEAR R MED SZ 4  [employer] [id number] Right 1 Implanted        Components used: [employer] Oxford Unicondylar Knee right arthroplasty femur size medial, tibial component size C and insert for mm.     Description of procedure:  Patient was seen in the preoperative holding where procedure, risks and complications, expectations and rehabilitation were discussed once more.  Patient understands and agrees to the treatment plan as set forth.  The right lower extremity was marked and informed consent was obtained.  Patient was then taken to the operating room where general anesthesia was induced.  Patient was positioned supine with the operative leg in the Oxford unicondylar knee  leg holders.  The right lower extremity was then prepped and draped in usual sterile fashion.  After the completion of a timeout procedure a skin incision was made from the superomedial patellar border down to the tibial tuberosity.  The subcutaneous tissue was opened using cautery.  Now the medial extensor mechanism and retinaculum was identified.  A medial parapatellar arthrotomy was performed.  Hoffa's fat pad was incised.  Now the anterior horn of the medial meniscus was removed and the medial capsular flap was developed.  A Hohmann was placed to protect the medial collateral ligament.  Now the first the ACL was inspected which was intact.  The patellofemoral joint showed no osteoarthritic changes of the trochlea. The patellar articular surface was intact.  The lateral compartment showed  intact articular surfaces with an intact meniscus as far as could be visualized.  It was decided to continue with the procedure as planned. First the osteophytes in the medial notch, the medial femoral condyle and medial tibial plateau were removed using osteotomes and rongeurs.  Then the external tibial alignment guide was used and placed in such a manner that the slope and varus valgus alignment were appropriate.  Using the G3 clamp with the large jig at  +0 mm the cutting block was placed in the right position.  This was then pinned using 2 pins.  A vertical osteotomy just medial of the tibial eminence was made using a reciprocating saw.  Now using an oscillating saw a horizontal tibial cut was made.  The bone block was resected and it was verified no bone fragments remained.    Now the femoral intramedullary canal was opened with the placed 1 cm anterior of the PCL insertion and 2 to 3 mm medial.  The center of the medial condyle was now marked.  The femoral drill guide was placed and pinned into place taking into acount the appropriate orientation.  The posterior femoral cut was made.The flexion gap was measured and deemed  adequate. The alignment guide was removed and a 0 spigot was placed.  The first milling was performed.  Excess osteophytes were removed.  At this point the remainder of the posterior medial meniscus was removed as well.  Now the trial components were placed in the flexion extension gap was measured.  A 3 mm was preferred in flexion and a 1 mm insert was preferred in extension.  Using the #2 spigot the second milling was done.  Again access osteophytes were removed.  Now the trial components were placed again and with a 3 spacer block the knee was well balanced in both flexion and extension.  At this point the femoral component preparation was finalized by means of the unicorn reaming guide with the posterior osteophyte cutting block.  Once this was completed the tibial component was further prepared.  Using the T-handle it was ascertained that the tibial component was not placed to posteriorly.  The tibial component was then pinned in place and the slot was prepared using the reciprocating keel prep.  Once more the trial components were placed with a 4 mm liner.  Again the knee was well balanced and had a full range of motion.    Tourniquet was inflated to 250 mmHg.  All trial components were then removed and the knee was copiously irrigated.  The final components which include a tibial plateau size C and femoral component size medium were cemented in place.  Excess cement was removed well cement was cured with the knee and 30 degrees of flexion and a 4 mm spacer block in place.  Once the cement was cured a posterior and anterior capsular block was placed using the anaesthetic cocktail. The definitive for mm liner was placed.  Again the knee was copiously irrigated.    Tourniquet was released. The medial parapatellar arthrotomy was closed using interrupted figure-of-8  #1 Vicryl sutures.  Subcutaneous tissue was closed using 2-0 Vicryl sutures.  The skin was closed using a 3-0 PDS suture.  The tourniquet was  deflated.     The incision was gently and compressively dressed with Steri-Strips and a combination of Alginate and Tegaderm.  Patient was awakened and returned to recovery in good condition.     Postoperative plan:  X-rays in PACU  Weightbearing as tolerated  Antibiotics per protocol  Plan on discharge today or tomorrow  PT/OT  Aspirin 162 mg daily for 4 weeks as DVT prophylaxis  Clinic visit Dr. Isabel 2 and 6 weeks      Shabbir Isabel MD     Adult Reconstruction  [facility] Department of Orthopaedic Surgery  Pager [phone]

## 2022-12-20 NOTE — ANESTHESIA PREPROCEDURE EVALUATION
Anesthesia Pre-Procedure Evaluation    Patient: Gibran Conner   MRN: [id number] : 1970        Procedure : Procedure(s):  Right unicompartmental total knee arthroplasty          Past Medical History:   Diagnosis Date     Arthritis      Diabetes (H) 2018    new DX     Hypertension      Sleep apnea     CPAP      Past Surgical History:   Procedure Laterality Date     ARTHROSCOPY SHOULDER, OPEN ROTATOR CUFF REPAIR, COMBINED Right 2018    Procedure: COMBINED ARTHROSCOPY SHOULDER, OPEN ROTATOR CUFF REPAIR;  1.  Right shoulder arthroscopic subcoracoid decompression.   2.  Right shoulder arthroscopic subacromial bursectomy and limited debridement.   3.  Open subscapularis tendon repair, right shoulder.   4.  Open biceps tenodesis, right shoulder. ;  Surgeon: Cuauhtemoc Monroy MD;  Location: [facility]     BACK SURGERY       ORTHOPEDIC SURGERY Right 2013    shoulder     ORTHOPEDIC SURGERY      meniscus      No Known Allergies   Social History     Tobacco Use     Smoking status: Former     Packs/day: 1.00     Years: 30.00     Pack years: 30.00     Types: Cigarettes     Quit date:      Years since quittin.9     Smokeless tobacco: Never   Substance Use Topics     Alcohol use: Yes     Comment: daily 3-4      Wt Readings from Last 1 Encounters:   22 125.5 kg (276 lb 11.2 oz)        Anesthesia Evaluation            ROS/MED HX  ENT/Pulmonary:     (+) sleep apnea, uses CPAP, tobacco use, Past use,     Neurologic:  - neg neurologic ROS     Cardiovascular:     (+) hypertension-----    METS/Exercise Tolerance:     Hematologic:  - neg hematologic  ROS     Musculoskeletal:   (+) arthritis,     GI/Hepatic:     (+) GERD,     Renal/Genitourinary:  - neg Renal ROS     Endo:     (+) type II DM, Not using insulin, Obesity,     Psychiatric/Substance Use:  - neg psychiatric ROS     Infectious Disease:  - neg infectious disease ROS     Malignancy:       Other:            Physical Exam    Airway        Mallampati: II   TM  distance: > 3 FB   Neck ROM: full   Mouth opening: > 3 cm    Respiratory Devices and Support         Dental  no notable dental history         Cardiovascular          Rhythm and rate: regular and normal     Pulmonary   pulmonary exam normal                OUTSIDE LABS:  CBC: No results found for: WBC, HGB, HCT, PLT  BMP:   Lab Results   Component Value Date     12/05/2022    POTASSIUM 5.3 12/05/2022    POTASSIUM 3.9 08/14/2018    CHLORIDE 98 12/05/2022    CO2 23 12/05/2022    BUN 13.1 12/05/2022    CR 0.74 12/05/2022    CR 0.62 (L) 08/14/2018     (H) 12/20/2022     (H) 12/05/2022     COAGS: No results found for: PTT, INR, FIBR  POC:   Lab Results   Component Value Date     (H) 08/14/2018     HEPATIC: No results found for: ALBUMIN, PROTTOTAL, ALT, AST, GGT, ALKPHOS, BILITOTAL, BILIDIRECT, LAUREN  OTHER:   Lab Results   Component Value Date    A1C 5.8 (H) 12/05/2022    DIPAK 10.1 (H) 12/05/2022       Anesthesia Plan    ASA Status:  3   NPO Status:  NPO Appropriate    Anesthesia Type: General.     - Airway: LMA   Induction: Intravenous.   Maintenance: Balanced.   Techniques and Equipment:       - Drips/Meds: Dexmed. infusion     Consents    Anesthesia Plan(s) and associated risks, benefits, and realistic alternatives discussed. Questions answered and patient/representative(s) expressed understanding.    - Discussed:     - Discussed with:  Patient      - Extended Intubation/Ventilatory Support Discussed: No.      - Patient is DNR/DNI Status: No    Use of blood products discussed: No .     Postoperative Care    Pain management: IV analgesics, Multi-modal analgesia, Oral pain medications.     - Plan for long acting post-op opioid use   PONV prophylaxis: Ondansetron (or other 5HT-3), Dexamethasone or Solumedrol     Comments:                Aashish Gates MD

## 2022-12-20 NOTE — PROGRESS NOTES
12/20/22 1615   Appointment Info   Signing Clinician's Name / Credentials (PT) Lacy Agustin, PT   Living Environment   People in Home alone  (planning to stay with parents)   Current Living Arrangements house   Home Accessibility stairs to enter home   Number of Stairs, Main Entrance 2   Stair Railings, Main Entrance railing on left side (ascending)   Living Environment Comments 2 steps to enter parent's home; main level living   Self-Care   Usual Activity Tolerance good   Current Activity Tolerance moderate   Equipment Currently Used at Home grab bar, toilet;grab bar, tub/shower   Fall history within last six months yes   Number of times patient has fallen within last six months 1   Activity/Exercise/Self-Care Comment normally independent with mobility and cares and works as a    General Information   Onset of Illness/Injury or Date of Surgery 12/20/22   Referring Physician Dr. Shabbir Isabel   Patient/Family Therapy Goals Statement (PT) return home tonight   Pertinent History of Current Problem (include personal factors and/or comorbidities that impact the POC) Patient seen POD #0 s/p R  unicompartmental TKA; see medical record for further information   Existing Precautions/Restrictions fall   Weight-Bearing Status - RLE weight-bearing as tolerated   Cognition   Cognitive Status Comments appears intact during session   Pain Assessment   Patient Currently in Pain Yes, see Vital Sign flowsheet  (1-2/10)   Integumentary/Edema   Integumentary/Edema Comments R knee incision; covered and acewrapped   Range of Motion (ROM)   ROM Comment R knee limited by recent surgery and mild pain   Strength (Manual Muscle Testing)   Strength Comments R knee limited by recent surgery and mild pain   Bed Mobility   Comment, (Bed Mobility) independent   Transfers   Comment, (Transfers) CGA for sit<>stand; use of walker   Gait/Stairs (Locomotion)   Distance in Feet 25 feet   Comment, (Gait/Stairs) use of walker and CGA    Balance   Balance Comments no gross LOB; current need for walker   Sensory Examination   Sensory Perception Comments intact per patient report   Clinical Impression   Criteria for Skilled Therapeutic Intervention Yes, treatment indicated   PT Diagnosis (PT) impaired functional mobility   Influenced by the following impairments decreased R knee ROM/strength; pain   Functional limitations due to impairments impaired independence with mobility and cares secondary to above deficits   Clinical Presentation (PT Evaluation Complexity) Stable/Uncomplicated   Clinical Presentation Rationale clinical judgement; level of assist   Clinical Decision Making (Complexity) low complexity   Planned Therapy Interventions (PT) gait training;ROM (range of motion);strengthening;stair training;transfer training   Anticipated Equipment Needs at Discharge (PT) walker, rolling;crutches, axillary   Risk & Benefits of therapy have been explained evaluation/treatment results reviewed;care plan/treatment goals reviewed;risks/benefits reviewed;current/potential barriers reviewed;participants voiced agreement with care plan;participants included;patient   PT Total Evaluation Time   PT Eval, Low Complexity Minutes (59713) 10   Plan of Care Review   Plan of Care Reviewed With patient   Physical Therapy Goals   PT Frequency One time eval and treatment only   PT Predicted Duration/Target Date for Goal Attainment 12/20/22   PT Goals Transfers;Gait;Stairs   PT: Transfers Supervision/stand-by assist;Sit to/from stand;Bed to/from chair;Assistive device   PT: Gait Supervision/stand-by assist;Rolling walker;150 feet   PT: Stairs 2 stairs;Assistive device;Rail on left  (CGA)   PT Discharge Planning   PT Discharge Recommendation (DC Rec)   (defer to Ortho)   PT Rationale for DC Rec Anticipate patient will be safe to discharge to home with assist from parents and use of walker on level surfaces and crutch/rail for stairs; recommend assist for all mobility  and cares initially; reports having OP PT scheduled   PT Brief overview of current status A x 1 with walker/crutches

## 2022-12-20 NOTE — ANESTHESIA CARE TRANSFER NOTE
Patient: Gibran Conner    Procedure: Procedure(s):  Right unicompartmental total knee arthroplasty       Diagnosis: Primary osteoarthritis of right knee [M17.11]  Diagnosis Additional Information: No value filed.    Anesthesia Type:   General     Note:      Level of Consciousness: awake  Oxygen Supplementation: face mask    Independent Airway: airway patency satisfactory and stable  Dentition: dentition unchanged  Vital Signs Stable: post-procedure vital signs reviewed and stable  Report to RN Given: handoff report given  Patient transferred to: PACU    Handoff Report: Identifed the Patient, Identified the Reponsible Provider, Reviewed the pertinent medical history, Discussed the surgical course, Reviewed Intra-OP anesthesia mangement and issues during anesthesia, Set expectations for post-procedure period and Allowed opportunity for questions and acknowledgement of understanding      Vitals:  Vitals Value Taken Time   /81 12/20/22 0927   Temp     Pulse 84 12/20/22 0929   Resp 35 12/20/22 0929   SpO2 98 % 12/20/22 0929   Vitals shown include unvalidated device data.    Electronically Signed By: ABI Paulino CRNA  December 20, 2022  9:30 AM

## 2022-12-20 NOTE — TELEPHONE ENCOUNTER
[facility] Call Center    Phone Message    May a detailed message be left on voicemail: yes     Reason for Call: Other: Colette from [facility] calling in re: pt - TKA / Colette asking if pt is on fast track and if he is being discharged today.  Colette can be reached at [phone]      Action Taken: Message routed to:  [facility] ([facility] Dr. Shabbir Isabel     Travel Screening: Not Applicable

## 2022-12-20 NOTE — ANESTHESIA POSTPROCEDURE EVALUATION
Patient: Gibran Conner    Procedure: Procedure(s):  Right unicompartmental total knee arthroplasty       Anesthesia Type:  General    Note:  Disposition: Inpatient   Postop Pain Control: Uneventful            Sign Out: Well controlled pain   PONV: No   Neuro/Psych: Uneventful            Sign Out: Acceptable/Baseline neuro status   Airway/Respiratory: Uneventful            Sign Out: Acceptable/Baseline resp. status   CV/Hemodynamics: Uneventful            Sign Out: Acceptable CV status; No obvious hypovolemia; No obvious fluid overload   Other NRE:    DID A NON-ROUTINE EVENT OCCUR? No           Last vitals:  Vitals Value Taken Time   /71 12/20/22 1100   Temp 98.1  F (36.7  C) 12/20/22 1050   Pulse 73 12/20/22 1105   Resp 19 12/20/22 1105   SpO2 91 % 12/20/22 1105       Electronically Signed By: Johanna Monroy MD  December 20, 2022  1:53 PM

## 2022-12-21 ENCOUNTER — APPOINTMENT (OUTPATIENT)
Dept: OCCUPATIONAL THERAPY | Facility: CLINIC | Age: 52
End: 2022-12-21
Attending: STUDENT IN AN ORGANIZED HEALTH CARE EDUCATION/TRAINING PROGRAM
Payer: COMMERCIAL

## 2022-12-21 VITALS
RESPIRATION RATE: 16 BRPM | OXYGEN SATURATION: 97 % | TEMPERATURE: 97.7 F | WEIGHT: 276.7 LBS | HEART RATE: 72 BPM | DIASTOLIC BLOOD PRESSURE: 91 MMHG | BODY MASS INDEX: 40.86 KG/M2 | SYSTOLIC BLOOD PRESSURE: 159 MMHG

## 2022-12-21 LAB — GLUCOSE BLDC GLUCOMTR-MCNC: 143 MG/DL (ref 70–99)

## 2022-12-21 PROCEDURE — 97535 SELF CARE MNGMENT TRAINING: CPT | Mod: GO

## 2022-12-21 PROCEDURE — 97165 OT EVAL LOW COMPLEX 30 MIN: CPT | Mod: GO

## 2022-12-21 PROCEDURE — 250N000011 HC RX IP 250 OP 636: Performed by: STUDENT IN AN ORGANIZED HEALTH CARE EDUCATION/TRAINING PROGRAM

## 2022-12-21 PROCEDURE — 82962 GLUCOSE BLOOD TEST: CPT

## 2022-12-21 PROCEDURE — 250N000013 HC RX MED GY IP 250 OP 250 PS 637: Performed by: STUDENT IN AN ORGANIZED HEALTH CARE EDUCATION/TRAINING PROGRAM

## 2022-12-21 RX ADMIN — ACETAMINOPHEN 975 MG: 325 TABLET, FILM COATED ORAL at 05:23

## 2022-12-21 RX ADMIN — SENNOSIDES AND DOCUSATE SODIUM 1 TABLET: 50; 8.6 TABLET ORAL at 08:25

## 2022-12-21 RX ADMIN — ASPIRIN 81 MG: 81 TABLET, COATED ORAL at 08:25

## 2022-12-21 RX ADMIN — KETOROLAC TROMETHAMINE 15 MG: 15 INJECTION, SOLUTION INTRAMUSCULAR; INTRAVENOUS at 05:24

## 2022-12-21 ASSESSMENT — ACTIVITIES OF DAILY LIVING (ADL)
ADLS_ACUITY_SCORE: 20

## 2022-12-21 NOTE — PROGRESS NOTES
12/21/22 0800   Appointment Info   Signing Clinician's Name / Credentials (OT) Charline Hunter, OTR/L   Quick Adds   Quick Adds Certification   Living Environment   People in Home   (plans to d/c to parents' home)   Current Living Arrangements house   Home Accessibility stairs to enter home   Number of Stairs, Main Entrance 2   Stair Railings, Main Entrance railing on left side (ascending)   Transportation Anticipated car, drives self;family or friend will provide   Living Environment Comments pt will d/c to parents' house. Pt's parents' home has walk in shower with shower chair and grab bars; they have comfort height toilet with vanity next to toilet. Pt reports his mom and dad can assist with IADLs as needed at home.   Self-Care   Usual Activity Tolerance good   Current Activity Tolerance good   Fall history within last six months yes   Number of times patient has fallen within last six months 1   Activity/Exercise/Self-Care Comment pt reports indep with ADLs and IADLs at baseline   General Information   Onset of Illness/Injury or Date of Surgery 12/20/22   Referring Physician Dr. Shabbir Isabel   Patient/Family Therapy Goal Statement (OT) d/c to parents' house today   Additional Occupational Profile Info/Pertinent History of Current Problem Patient seen POD #1 s/p R  unicompartmental TKA; see medical record for further information   Existing Precautions/Restrictions weight bearing   Cognitive Status Examination   Cognitive Status Comments no cog impairment noted   Pain Assessment   Patient Currently in Pain No   Transfers   Transfers sit-stand transfer;toilet transfer   Sit-Stand Transfer   Sit-Stand [place] (Transfers) modified independence   Toilet Transfer   [place] Level (Toilet Transfer) modified independence   Activities of Daily Living   BADL Assessment/Intervention lower body dressing   Lower Body Dressing Assessment/Training   [place] Level (Lower Body Dressing) modified independence    Clinical Impression   Criteria for Skilled Therapeutic Interventions Met (OT) Yes, treatment indicated   OT Diagnosis RTKA   OT Problem List-Impairments impacting ADL activity tolerance impaired;post-surgical precautions;strength;flexibility;mobility   Assessment of Occupational Performance 5 or more Performance Deficits   Identified Performance Deficits dressing, toileting, showering, functional transfers and IADLs   Planned Therapy Interventions (OT) ADL retraining;IADL retraining;home program guidelines;progressive activity/exercise   Clinical Decision Making Complexity (OT) low complexity   Anticipated Equipment Needs Upon Discharge (OT) tub bench   Risk & Benefits of therapy have been explained evaluation/treatment results reviewed;care plan/treatment goals reviewed;risks/benefits reviewed;current/potential barriers reviewed;participants voiced agreement with care plan;participants included;patient   Clinical Impression Comments pt requires skilled OT during IP setting   OT Total Evaluation Time   OT Eval, Low Complexity Minutes (25946) 5   Therapy Certification   Medical Diagnosis RTKA   Start of Care Date 12/21/22   Certification date from 12/21/22   Certification date to 12/21/22   OT Goals   Therapy Frequency (OT) One time eval and treatment   OT Predicted Duration/Target Date for Goal Attainment 12/21/22   OT Goals Toilet Transfer/Toileting;Lower Body Dressing;Hygiene/Grooming   OT: Hygiene/Grooming Completed  (pt declines)   OT: Lower Body Dressing Modified independent;Goal Met   OT: Toilet Transfer/Toileting Modified independent;Goal Met   Interventions   Interventions Quick Adds Self-Care/Home Management   Self-Care/Home Management   Self-Care/Home Mgmt/ADL, Compensatory, Meal Prep Minutes (40516) 10   Treatment Detail/Skilled Intervention Pt demonstrates mod indep with LB dressing including socks and tie shoes. Pt reports donning pants without assist today and has no concerns. Pt is mod I with toilet  transfer. Pt is educated on tubshower transfer and how he can use a chair or tubshower bench; pt has tubshower at home however going to his parents' home for few days which they have walk in shower. OT also educates on walker tray or walker basket for transporting items. Pt verbalizes understanding. OT recommends no driving when on narcotics. Pt recommends to ask provider when he can return back to driving; pt verbalizes understanding. Pt reports he will do microwave meals at home; OT does suggest avoiding oven if possible while utilizing walker to ensure safety with meal prep in which pt agrees.   OT Discharge Planning   OT Plan d/c from OT   OT Discharge Recommendation (DC Rec)   (defer to ortho)   OT Rationale for DC Rec Anticipate pt may need assist with IADLs   OT Brief overview of current status Pt is mod I with LB dressing and toilet transfer   Total Session Time   Timed Code Treatment Minutes 10   Total Session Time (sum of timed and untimed services) 15      [facility]  OUTPATIENT OCCUPATIONAL THERAPY  EVALUATION  PLAN OF TREATMENT FOR OUTPATIENT REHABILITATION  (COMPLETE FOR INITIAL CLAIMS ONLY)  Patient's Last Name, First Name, M.I.  YOB: 1970  Gibran Conner                          Provider's Name  [facility] Medical Record No.  [id number]                             Onset Date:  12/20/22   Start of Care Date:  12/21/22   Type:     ___PT   _X_OT   ___SLP Medical Diagnosis:  RTKA                    OT Diagnosis:  RTKA Visits from SOC:  1     See note for plan of treatment, functional goals and certification details    I CERTIFY THE NEED FOR THESE SERVICES FURNISHED UNDER        THIS PLAN OF TREATMENT AND WHILE UNDER MY CARE     (Physician co-signature of this document indicates review and certification of the therapy plan).

## 2022-12-21 NOTE — PLAN OF CARE
Patient vital signs are at baseline: Yes, on home CPAP overnight   Patient able to ambulate as they were prior to admission or with assist devices provided by therapies during their stay:  Yes, SBA gait belt and walker   Patient MUST void prior to discharge:  Yes  Patient able to tolerate oral intake:  Yes  Pain has adequate pain control using Oral analgesics:  Yes, denies pain   Does patient have an identified :  Yes  Has goal D/C date and time been discussed with patient:  Yes, possible discharge home today.     CMS intact. Dressing dry and intact. Calm and pleasant with cares. Will continue to provide supportive care.

## 2022-12-21 NOTE — PROGRESS NOTES
Pt is alert and oriented x4, SBA with a walker and GB, on regular diet, uses CPAP at night, voiding without difficulty, passing flatus. Pt c/o pain and was given Oxycodone 5mg was effective, the dressing on the incision is covered with Ace wraps, PT worked with the pt this evening, walked at the hallway. Pt is ready to be discharged, meds are on the locked box at the med room, pt stated that he can stay tonight and go home tomorrow, also, he stated that his parents are old and are the ones who has to get him home tomorrow, and  the weather is not going to be bad, so pt was wondering if he can be able to get transportation tomorrow morning, SW will be consulted.    /50 (BP Location: Left arm)   Pulse 72   Temp 97.4  F (36.3  C) (Temporal)   Resp 22   Wt 125.5 kg (276 lb 11.2 oz)   SpO2 92%   BMI 40.86 kg/m

## 2022-12-21 NOTE — PLAN OF CARE
Goal Outcome Evaluation:      Plan of Care Reviewed With: patient    CMS intact.  Ace removed.  Aquacel CDI.  SBA voiding in bathroom.  Discharge instructions reviewed with patient who verbalized understanding.  Discharged to home with parents.

## 2022-12-21 NOTE — DISCHARGE SUMMARY
[facility]  Discharge Summary            Interval History:     52 year old male admitted postoperatively for elective Right Unicompartmental Total Knee Arthroplasty with Dr. Isabel due to DJD unresponsive to non operative treatment.  There were no notable intraoperative complications.  Patient being discharged post op Day #1.  Gibran Conner received skilled nursing, PT, OT, SW inquiry.  There was no Hospitalist care during the stay.     Gibran Conner has progressed satisfactorily with ambulation and pain management and without medical complication.  Patient is confident in their discharge and has met goals with PT and OT. Pt lives at home and will be discharged to home with assistance from his parents based on home living conditions and level of support. He had some nausea and was unable to void urine initially post-op so he stayed overnight in hospital but this has now resolved and he feels confident in discharge home this morning. Gibran Conner leaves the hospital in stable condition with good chance for recovery.  Today: 12/21/22    Pain: well-controlled with acetaminophen and oxycodone  Nausea: Resolved  Light headedness : none  Chest pain: none  Shortness of breath: none              Assessment and Plan:     S/P Right Day Right Unicompartmental Total Knee Arthroplasty .  Final Diagnosis: Right Unicompartmental Total Knee Arthroplasty   VSS, Hemodynamically asymptomatic, pain management adequate.    PLAN:  DVT prophylaxis with ASA 81 mg BID (162 mg daily) for 4 weeks (28 days following surgery), as patient has no history of VTE. Patient normally takes ASA 81 mg daily and should resume ASA 81 mg after taking  mg daily for 28 days after surgery for post-op VTE prophylaxis.   Pain management with acetaminophen, oxycodone    Patient instructions attached to discharge.      Discharge to Home  Follow up in clinic as previously scheduled, approx 10-14 days post op.    [facility]  [address]   [place]  [address]  [phone]  [phone] fax  [phone] for scheduling                      Physical Exam:   Patient Vitals for the past 12 hrs:   BP Temp Temp src Pulse Resp SpO2   12/21/22 0033 136/75 97.4  F (36.3  C) Oral 63 18 95 %   12/20/22 2030 137/85 (!) 96.6  F (35.9  C) Temporal 67 20 96 %     No results found for: HGB    Patient is alert and oriented.  Vitals: stable  Neurovascular status: Grossly intact to touch. Good perfusion. Brisk cap refill. 2+ DP pulses. DF, PF intact.   Dressing: clean and dry  Calves: soft and non tender          ABI Torre, CNP  [facility] - Surgery    12/21/2022

## 2022-12-21 NOTE — PLAN OF CARE
Occupational Therapy Discharge Summary    Reason for therapy discharge:    All goals and outcomes met, no further needs identified.    Progress towards therapy goal(s). See goals on Care Plan in [facility] electronic health record for goal details.  Goals met    Therapy recommendation(s):    No further therapy is recommended.

## 2022-12-26 ENCOUNTER — THERAPY VISIT (OUTPATIENT)
Dept: PHYSICAL THERAPY | Facility: CLINIC | Age: 52
End: 2022-12-26
Payer: COMMERCIAL

## 2022-12-26 DIAGNOSIS — Z96.651 AFTERCARE FOLLOWING RIGHT KNEE JOINT REPLACEMENT SURGERY: ICD-10-CM

## 2022-12-26 DIAGNOSIS — G89.29 CHRONIC PAIN OF RIGHT KNEE: ICD-10-CM

## 2022-12-26 DIAGNOSIS — M25.561 CHRONIC PAIN OF RIGHT KNEE: ICD-10-CM

## 2022-12-26 DIAGNOSIS — M17.11 PRIMARY OSTEOARTHRITIS OF RIGHT KNEE: ICD-10-CM

## 2022-12-26 DIAGNOSIS — Z47.1 AFTERCARE FOLLOWING RIGHT KNEE JOINT REPLACEMENT SURGERY: ICD-10-CM

## 2022-12-26 PROCEDURE — 97110 THERAPEUTIC EXERCISES: CPT | Mod: GP | Performed by: PHYSICAL THERAPIST

## 2022-12-26 PROCEDURE — 97161 PT EVAL LOW COMPLEX 20 MIN: CPT | Mod: GP | Performed by: PHYSICAL THERAPIST

## 2022-12-26 ASSESSMENT — ACTIVITIES OF DAILY LIVING (ADL)
GO DOWN STAIRS: ACTIVITY IS VERY DIFFICULT
LIMPING: THE SYMPTOM AFFECTS MY ACTIVITY MODERATELY
GO UP STAIRS: ACTIVITY IS VERY DIFFICULT
STAND: ACTIVITY IS FAIRLY DIFFICULT
RISE FROM A CHAIR: ACTIVITY IS MINIMALLY DIFFICULT
SQUAT: I AM UNABLE TO DO THE ACTIVITY
GIVING WAY, BUCKLING OR SHIFTING OF KNEE: THE SYMPTOM AFFECTS MY ACTIVITY MODERATELY
WEAKNESS: THE SYMPTOM AFFECTS MY ACTIVITY SEVERELY
SWELLING: THE SYMPTOM AFFECTS MY ACTIVITY MODERATELY
RAW_SCORE: 22
KNEE_ACTIVITY_OF_DAILY_LIVING_SCORE: 31.43
STIFFNESS: THE SYMPTOM AFFECTS MY ACTIVITY SEVERELY
HOW_WOULD_YOU_RATE_THE_OVERALL_FUNCTION_OF_YOUR_KNEE_DURING_YOUR_USUAL_DAILY_ACTIVITIES?: SEVERELY ABNORMAL
KNEE_ACTIVITY_OF_DAILY_LIVING_SUM: 22
KNEEL ON THE FRONT OF YOUR KNEE: I AM UNABLE TO DO THE ACTIVITY
HOW_WOULD_YOU_RATE_THE_CURRENT_FUNCTION_OF_YOUR_KNEE_DURING_YOUR_USUAL_DAILY_ACTIVITIES_ON_A_SCALE_FROM_0_TO_100_WITH_100_BEING_YOUR_LEVEL_OF_KNEE_FUNCTION_PRIOR_TO_YOUR_INJURY_AND_0_BEING_THE_INABILITY_TO_PERFORM_ANY_OF_YOUR_USUAL_DAILY_ACTIVITIES?: 10
AS_A_RESULT_OF_YOUR_KNEE_INJURY,_HOW_WOULD_YOU_RATE_YOUR_CURRENT_LEVEL_OF_DAILY_ACTIVITY?: SEVERELY ABNORMAL
SIT WITH YOUR KNEE BENT: ACTIVITY IS MINIMALLY DIFFICULT
WALK: ACTIVITY IS VERY DIFFICULT
PAIN: THE SYMPTOM AFFECTS MY ACTIVITY SEVERELY

## 2022-12-26 NOTE — PROGRESS NOTES
[facility] Initial Evaluation     Present: no    Subjective:  Gibran Conner is a 52 year old male with complaints of right knee . Presents to PT for aftercare s/p R knee unicompartmental replacement 12/20/22. Pt reports that things are going well thus far, pain is more of an ache. Denies vague symptoms. Wants to get back to work pain free, walk longer distances, and build up overall activity.     Symptoms commenced as a result of: surgery. Condition occurred in the following environment: surgery. Onset of symptoms: 12/20/22(date of MD PT order). Location of symptoms: anterior right knee. Pain level on number scale: 1/10. Quality of pain: aching. Associated symptoms: swelling. Pain frequency (constant/intermittent): constant. Symptoms are exacerbated by: walking, standing, weightbearing, stairs. Symptoms are relieved by: pain meds, cold compressio. Progression of symptoms since onset: improving. Imaging: yes see epic. Previous treatment: surgery. Response to previous treatment: yes. General health as reported by patient is good. Pertinent medical history includes: See Epic. Medical allergies: see Epic. Other pertinent surgeries: see Epic. Current medications: See [facility]. Occupation:  . Work/restriction status: limited by current surgery/winter break. Primary job tasks: standing, sitting. Barriers at home/work: None reported by patient. Red flags: None reported by patient.    Objective  Post-Operative Physical Therapy Examination     Physical Mobility Status  Gait:   Mod limp with walker  Transfers: independent with walker     Anthropometric Measures       Right Left Difference   Joint ROM         Hyperextension 0 deg 3 deg 3 deg   Extension 0 deg 0 deg 0 deg   Flexion 132 deg 135 deg 3 deg   Circumferential Measures         Joint Line 18 inch 16 inch 2- inches        Quadriceps Muscle Activation Right Left   Isometric Quad Activation fair good   Straight Leg  Raising poor good      Status of Incision: under bandage, no signs of leakage/infection visually    Key Findings  Aftercare s/p uni/partial knee replacement right knee with routine healing  Assessment/Plan:    Patient is a 52 year old male with right side knee complaints.    Patient has the following significant findings with corresponding treatment plan.                Diagnosis 1:  Right knee pain, aftercare s/p unicompartmental right knee replacement  Pain -  manual therapy, self management, education and home program  Decreased ROM/flexibility - manual therapy and therapeutic exercise  Decreased joint mobility - manual therapy and therapeutic exercise  Decreased strength - therapeutic exercise and therapeutic activities  Impaired gait - gait training  Impaired muscle performance - neuro re-education  Decreased function - therapeutic activities    Therapy Evaluation Codes:     1) History comprised of:   Personal factors that impact the plan of care:      Time since onset of symptoms.    Comorbidity factors that impact the plan of care are:      None.     Medications impacting care: Pain.  2) Examination of Body Systems comprised of:   Body structures and functions that impact the plan of care:      Knee.   Activity limitations that impact the plan of care are:      Driving, Dressing, Lifting, Sports, Squatting/kneeling, Stairs, Standing, Walking, Sleeping and Laying down.  3) Clinical presentation characteristics are:   Stable/Uncomplicated.  4) Decision-Making    Low complexity using standardized patient assessment instrument and/or measureable assessment of functional outcome.    Cumulative Therapy Evaluation is: Low complexity.    Previous and current functional limitations:  (See Goal Flow Sheet for this information)    Short term and Long term goals: (See Goal Flow Sheet for this information)     Communication ability:  Patient appears to be able to clearly communicate and understand verbal and written  communication and follow directions correctly.  Treatment Explanation - The following has been discussed with the patient:   RX ordered/plan of care  Anticipated outcomes  Possible risks and side effects  This patient would benefit from PT intervention to resume normal activities.   Rehab potential is good.    Frequency:  2 X week, once daily  Duration:  for 3 weeks, 1 x week for 5 weeks    Discharge Plan:  Achieve all LTG.  Independent in home treatment program.  Reach maximal therapeutic benefit.    Please refer to the daily flowsheet for treatment today, total treatment time and time spent performing 1:1 timed codes.     Inquires  Joshua Sanches PT, DPT, CSCS  Physical Therapist  [facility]  [id number] [place] [address], [address] [address] [phone]

## 2023-01-04 ENCOUNTER — TELEPHONE (OUTPATIENT)
Dept: ORTHOPEDICS | Facility: CLINIC | Age: 53
End: 2023-01-04

## 2023-01-04 NOTE — TELEPHONE ENCOUNTER
[facility] Call Center    Phone Message    May a detailed message be left on voicemail: no     Reason for Call: Other: Needs to reschedule post op appt    Action Taken: Message routed to:  [facility]    Travel Screening: Not Applicable

## 2023-01-04 NOTE — TELEPHONE ENCOUNTER
Phoned patient back to reschedule post op appt.    Appt rescheduled with HEATHER Massey this Friday at 3:20pm.    Patient thanked writer for call back and has no further questions.    Alisha Vela RN on 1/4/2023 at 10:42 AM

## 2023-01-10 ENCOUNTER — THERAPY VISIT (OUTPATIENT)
Dept: PHYSICAL THERAPY | Facility: CLINIC | Age: 53
End: 2023-01-10
Payer: COMMERCIAL

## 2023-01-10 DIAGNOSIS — Z96.651 AFTERCARE FOLLOWING RIGHT KNEE JOINT REPLACEMENT SURGERY: ICD-10-CM

## 2023-01-10 DIAGNOSIS — M25.561 CHRONIC PAIN OF RIGHT KNEE: Primary | ICD-10-CM

## 2023-01-10 DIAGNOSIS — G89.29 CHRONIC PAIN OF RIGHT KNEE: Primary | ICD-10-CM

## 2023-01-10 DIAGNOSIS — Z47.1 AFTERCARE FOLLOWING RIGHT KNEE JOINT REPLACEMENT SURGERY: ICD-10-CM

## 2023-01-10 PROCEDURE — 97116 GAIT TRAINING THERAPY: CPT | Mod: GP | Performed by: PHYSICAL THERAPIST

## 2023-01-10 PROCEDURE — 97110 THERAPEUTIC EXERCISES: CPT | Mod: GP | Performed by: PHYSICAL THERAPIST

## 2023-01-13 ENCOUNTER — OFFICE VISIT (OUTPATIENT)
Dept: ORTHOPEDICS | Facility: CLINIC | Age: 53
End: 2023-01-13
Payer: COMMERCIAL

## 2023-01-13 DIAGNOSIS — Z98.890 S/P KNEE SURGERY: ICD-10-CM

## 2023-01-13 DIAGNOSIS — G89.18 ACUTE POST-OPERATIVE PAIN: Primary | ICD-10-CM

## 2023-01-13 PROCEDURE — 99024 POSTOP FOLLOW-UP VISIT: CPT | Performed by: PHYSICIAN ASSISTANT

## 2023-01-13 RX ORDER — HYDROCODONE BITARTRATE AND ACETAMINOPHEN 5; 325 MG/1; MG/1
1 TABLET ORAL EVERY 4 HOURS PRN
Qty: 20 TABLET | Refills: 0 | Status: SHIPPED | OUTPATIENT
Start: 2023-01-13 | End: 2023-01-16

## 2023-01-13 NOTE — PROGRESS NOTES
ASSESSMENT/PLAN:  Gibran Conner is a 52 year old who is status post right knee uniarthroplasty with Dr. Isabel on 1/13/22.    Basic wound cares were performed at today's visit including trimming of Monocryl suture tails were clipped to the level of the skin. We spent time discussing future wound/incision cares. We reviewed recommendations for bathing and hygiene. He will refrain from soaking in a tub or pool until the incision is fully epithelialized (nearly there now). Small amount of Norco sent to pharmacy to help with night time pain. He may continue taking Aleve as well.     The patient will see Dr. Isabel at six weeks post op. Daniel has our clinic number and will call with any questions or concerns.    Mariel Cardenas PA-C  Orthopaedic Surgery    _________________________________    HISTORY OF PRESENT ILLNESS:  Gibran Conner is a 52 year old male who is approximately 3 weeks status post the above procedure.    Weight bearing status/devices: FWB independently  Pain level and management: pain is 1/10, currently using Aleve. Difficulty with nighttime pain.   Physical therapy & ROM: 0-80     The patient endorses swelling around the surgical incision but denies surrounding redness. The incision has been dry, without discharge or drainage. Gibran denies recent fevers and chills, as well as any other symptoms concerning for infection.     DVT prophylaxis: 162mg for 30 days  Patient denies calf pain or tenderness.      MEDICATIONS:   Current Outpatient Rx   Medication Sig Dispense Refill     acetaminophen (TYLENOL) 325 MG tablet Take 2 tablets (650 mg) by mouth every 4 hours as needed for other (mild pain) 100 tablet 0     amLODIPine (NORVASC) 5 MG tablet Take 5 mg by mouth every morning       aspirin 81 MG EC tablet Take 1 tablet (81 mg) by mouth 2 times daily 60 tablet 0     ibuprofen (ADVIL/MOTRIN) 600 MG tablet Take 1 tablet (600 mg) by mouth every 6 hours as needed for mild pain 30 tablet 0      LISINOPRIL PO Take 40 mg by mouth every evening       metFORMIN (GLUCOPHAGE) 500 MG tablet Take 500 mg by mouth 2 times daily (with meals)       Multiple Vitamins-Minerals (MULTIVITAMIN ADULT PO) Take by mouth every morning       oxyCODONE (ROXICODONE) 5 MG tablet Take 1-2 tablets (5-10 mg) by mouth every 4 hours as needed for moderate to severe pain (Patient not taking: Reported on 1/13/2023) 26 tablet 0     polyethylene glycol (MIRALAX) 17 g packet Take 17 g by mouth daily (Patient not taking: Reported on 1/13/2023) 7 packet 0     senna-docusate (SENOKOT-S/PERICOLACE) 8.6-50 MG tablet Take 1-2 tablets by mouth 2 times daily Take while on oral narcotics to prevent or treat constipation. (Patient not taking: Reported on 1/13/2023) 30 tablet 0         ALLERGIES: Patient has no known allergies.       PHYSICAL EXAMINATION:   On physical examination the patient is comfortable and is in no acute distress. The affect is appropriate and breathing is non-labored.    Surgical wound: The surgical wound was exposed and found to be clean and dry, without drainage or discharge. There is mild edema around the incision. There is no erythema. The skin was appropriately warm to touch.     ROM: 0-118  Isometric activation of the quadriceps: in tact  SLR: strong SLR without lag  Gait: ambulates independently, can for extra support outdoors.     Motor intact distally throughout the tibial and peroneal nerve distributions, 5/5 strength with tibialis anterior, gastrocnemius and soleus, EHL, FHL firing  Sensation intact to light touch throughout superficial peroneal, deep peroneal, tibial, saphenous, and sural nerves  Dorsalis pedis and posterior tibial pulses palpable, toes warm and well-perfused

## 2023-01-13 NOTE — LETTER
1/13/2023         RE: Gibran Conner  [address] [address]        Dear Colleague,    Thank you for referring your patient, Gibran Conner, to the [facility] [place]. Please see a copy of my visit note below.    ASSESSMENT/PLAN:  Gibran Conner is a 52 year old who is status post right knee uniarthroplasty with Dr. Isabel on 1/13/22.    Basic wound cares were performed at today's visit including trimming of Monocryl suture tails were clipped to the level of the skin. We spent time discussing future wound/incision cares. We reviewed recommendations for bathing and hygiene. He will refrain from soaking in a tub or pool until the incision is fully epithelialized (nearly there now). Small amount of Norco sent to pharmacy to help with night time pain. He may continue taking Aleve as well.     The patient will see Dr. Isabel at six weeks post op. Daniel has our clinic number and will call with any questions or concerns.    Mariel Cardenas PA-C  Orthopaedic Surgery    _________________________________    HISTORY OF PRESENT ILLNESS:  Gibran Conner is a 52 year old male who is approximately 3 weeks status post the above procedure.    Weight bearing status/devices: FWB independently  Pain level and management: pain is 1/10, currently using Aleve. Difficulty with nighttime pain.   Physical therapy & ROM: 0-80     The patient endorses swelling around the surgical incision but denies surrounding redness. The incision has been dry, without discharge or drainage. Gibran denies recent fevers and chills, as well as any other symptoms concerning for infection.     DVT prophylaxis: 162mg for 30 days  Patient denies calf pain or tenderness.      MEDICATIONS:   Current Outpatient Rx   Medication Sig Dispense Refill     acetaminophen (TYLENOL) 325 MG tablet Take 2 tablets (650 mg) by mouth every 4 hours as needed for other (mild pain) 100 tablet 0     amLODIPine  (NORVASC) 5 MG tablet Take 5 mg by mouth every morning       aspirin 81 MG EC tablet Take 1 tablet (81 mg) by mouth 2 times daily 60 tablet 0     ibuprofen (ADVIL/MOTRIN) 600 MG tablet Take 1 tablet (600 mg) by mouth every 6 hours as needed for mild pain 30 tablet 0     LISINOPRIL PO Take 40 mg by mouth every evening       metFORMIN (GLUCOPHAGE) 500 MG tablet Take 500 mg by mouth 2 times daily (with meals)       Multiple Vitamins-Minerals (MULTIVITAMIN ADULT PO) Take by mouth every morning       oxyCODONE (ROXICODONE) 5 MG tablet Take 1-2 tablets (5-10 mg) by mouth every 4 hours as needed for moderate to severe pain (Patient not taking: Reported on 1/13/2023) 26 tablet 0     polyethylene glycol (MIRALAX) 17 g packet Take 17 g by mouth daily (Patient not taking: Reported on 1/13/2023) 7 packet 0     senna-docusate (SENOKOT-S/PERICOLACE) 8.6-50 MG tablet Take 1-2 tablets by mouth 2 times daily Take while on oral narcotics to prevent or treat constipation. (Patient not taking: Reported on 1/13/2023) 30 tablet 0         ALLERGIES: Patient has no known allergies.       PHYSICAL EXAMINATION:   On physical examination the patient is comfortable and is in no acute distress. The affect is appropriate and breathing is non-labored.    Surgical wound: The surgical wound was exposed and found to be clean and dry, without drainage or discharge. There is mild edema around the incision. There is no erythema. The skin was appropriately warm to touch.     ROM: 0-118  Isometric activation of the quadriceps: in tact  SLR: strong SLR without lag  Gait: ambulates independently, can for extra support outdoors.     Motor intact distally throughout the tibial and peroneal nerve distributions, 5/5 strength with tibialis anterior, gastrocnemius and soleus, EHL, FHL firing  Sensation intact to light touch throughout superficial peroneal, deep peroneal, tibial, saphenous, and sural nerves  Dorsalis pedis and posterior tibial pulses palpable,  toes warm and well-perfused      Mariel Cardenas PA-C

## 2023-01-13 NOTE — NURSING NOTE
Reason For Visit:   Chief Complaint   Patient presents with     Surgical Followup     3 week POP right uni knee [place] DOS 12/20/22        There were no vitals taken for this visit.    Pain Assessment  Patient Currently in Pain: Yes  0-10 Pain Scale: 1  Primary Pain Location: Knee (Right)  Pain Descriptors: Other (comment) (it is just there)  Aggravating Factors: Other (comment), Exercise (sleeping)    Bri Melchor ATC

## 2023-01-17 ENCOUNTER — THERAPY VISIT (OUTPATIENT)
Dept: PHYSICAL THERAPY | Facility: CLINIC | Age: 53
End: 2023-01-17
Payer: COMMERCIAL

## 2023-01-17 DIAGNOSIS — Z96.651 AFTERCARE FOLLOWING RIGHT KNEE JOINT REPLACEMENT SURGERY: ICD-10-CM

## 2023-01-17 DIAGNOSIS — G89.29 CHRONIC PAIN OF RIGHT KNEE: Primary | ICD-10-CM

## 2023-01-17 DIAGNOSIS — M25.561 CHRONIC PAIN OF RIGHT KNEE: Primary | ICD-10-CM

## 2023-01-17 DIAGNOSIS — Z47.1 AFTERCARE FOLLOWING RIGHT KNEE JOINT REPLACEMENT SURGERY: ICD-10-CM

## 2023-01-17 PROCEDURE — 97112 NEUROMUSCULAR REEDUCATION: CPT | Mod: GP | Performed by: PHYSICAL THERAPIST

## 2023-01-17 PROCEDURE — 97110 THERAPEUTIC EXERCISES: CPT | Mod: GP | Performed by: PHYSICAL THERAPIST

## 2023-01-24 ENCOUNTER — THERAPY VISIT (OUTPATIENT)
Dept: PHYSICAL THERAPY | Facility: CLINIC | Age: 53
End: 2023-01-24
Payer: COMMERCIAL

## 2023-01-24 DIAGNOSIS — Z96.651 AFTERCARE FOLLOWING RIGHT KNEE JOINT REPLACEMENT SURGERY: ICD-10-CM

## 2023-01-24 DIAGNOSIS — M25.561 CHRONIC PAIN OF RIGHT KNEE: Primary | ICD-10-CM

## 2023-01-24 DIAGNOSIS — Z47.1 AFTERCARE FOLLOWING RIGHT KNEE JOINT REPLACEMENT SURGERY: ICD-10-CM

## 2023-01-24 DIAGNOSIS — G89.29 CHRONIC PAIN OF RIGHT KNEE: Primary | ICD-10-CM

## 2023-01-24 PROCEDURE — 97110 THERAPEUTIC EXERCISES: CPT | Mod: GP | Performed by: PHYSICAL THERAPIST

## 2023-01-24 PROCEDURE — 97112 NEUROMUSCULAR REEDUCATION: CPT | Mod: GP | Performed by: PHYSICAL THERAPIST

## 2023-01-25 ENCOUNTER — TELEPHONE (OUTPATIENT)
Dept: ORTHOPEDICS | Facility: CLINIC | Age: 53
End: 2023-01-25
Payer: COMMERCIAL

## 2023-01-25 NOTE — TELEPHONE ENCOUNTER
Reason for Call:  Form, our goal is to have forms completed with 7 days, however, some forms may require a visit or additional information.    Type of letter, form or note:  long term disability forms     Who is the form from?: Patient    Where did the form come from: Patient or family brought in       Phone number of person requesting form: [phone]  Can we leave a detailed message on this number:  YES    Desired completion date of form: ASAP      How will form be returned?:  Faxed attn Yoselin- [phone] and Mailed to Employee Benefits Dept,  [address] [place] [address]    Has the patient signed a consent form for release of information (may be included with form)? YES    Additional comments: s/p right TKA, DOS 12/20/22- Dr. Isabel.  Appt with provider on 1/27/23.     Form was started and place in accordion folder labeled forms for provider review/ completion at [facility].

## 2023-01-27 ENCOUNTER — ANCILLARY PROCEDURE (OUTPATIENT)
Dept: GENERAL RADIOLOGY | Facility: CLINIC | Age: 53
End: 2023-01-27
Attending: STUDENT IN AN ORGANIZED HEALTH CARE EDUCATION/TRAINING PROGRAM
Payer: COMMERCIAL

## 2023-01-27 ENCOUNTER — OFFICE VISIT (OUTPATIENT)
Dept: ORTHOPEDICS | Facility: CLINIC | Age: 53
End: 2023-01-27
Payer: COMMERCIAL

## 2023-01-27 VITALS
BODY MASS INDEX: 42.06 KG/M2 | WEIGHT: 284 LBS | DIASTOLIC BLOOD PRESSURE: 68 MMHG | HEIGHT: 69 IN | SYSTOLIC BLOOD PRESSURE: 120 MMHG

## 2023-01-27 DIAGNOSIS — Z47.89 ORTHOPEDIC AFTERCARE: ICD-10-CM

## 2023-01-27 DIAGNOSIS — Z96.651 S/P RIGHT UNICOMPARTMENTAL KNEE REPLACEMENT: Primary | ICD-10-CM

## 2023-01-27 DIAGNOSIS — Z96.651 S/P TOTAL KNEE ARTHROPLASTY, RIGHT: ICD-10-CM

## 2023-01-27 PROCEDURE — 73562 X-RAY EXAM OF KNEE 3: CPT | Mod: TC | Performed by: RADIOLOGY

## 2023-01-27 PROCEDURE — 99024 POSTOP FOLLOW-UP VISIT: CPT | Performed by: STUDENT IN AN ORGANIZED HEALTH CARE EDUCATION/TRAINING PROGRAM

## 2023-01-27 RX ORDER — CEPHALEXIN 500 MG/1
1000 CAPSULE ORAL 3 TIMES DAILY
Qty: 42 CAPSULE | Refills: 0 | Status: SHIPPED | OUTPATIENT
Start: 2023-01-27 | End: 2023-02-03

## 2023-01-27 ASSESSMENT — KOOS JR
GOING UP OR DOWN STAIRS: MODERATE
STRAIGHTENING KNEE FULLY: MILD
TWISING OR PIVOTING ON KNEE: MILD
HOW SEVERE IS YOUR KNEE STIFFNESS AFTER FIRST WAKING IN MORNING: MODERATE
BENDING TO THE FLOOR TO PICK UP OBJECT: SEVERE
RISING FROM SITTING: MILD
STANDING UPRIGHT: MILD
KOOS JR SCORING: 59.38

## 2023-01-27 NOTE — LETTER
January 27, 2023      Gibran Conner  [address] [address]        To Whom It May Concern:    Gibran Conner  was seen on 1/27/23.  Please excuse him from work through 2/24/23 due to surgery. His work ability will be re-eavluated in 1 month, at which time he will potentially return to work with restrictions.      Sincerely,        Shabbir Isabel MD

## 2023-01-27 NOTE — LETTER
1/27/2023         RE: Gibran Conner  [address] [address]        Dear Colleague,    Thank you for referring your patient, Gibran Conner, to the [facility] [place]. Please see a copy of my visit note below.        [facility]  Orthopaedic Surgery Consultation by Shabbir Isabel M.D.    Gibran Conner MRN# [id number]   Age: 52 year old YOB: 1970     Requesting physician: Matt Wall     Background history:  DX:  1. Hypertension  2. Type 2 diabetes mellitus latest A1c 7.2 according to patient    TREATMENTS:  1. Right knee menisectomy ~20 years ago   2. 12/20/2022, right medial unicompartmental knee arthroplasty, Dr. Isabel           History of Present Illness:     52-year-old male presenting with chronic right knee pain due to end-stage osteoarthritic changes in medial compartment.  Insufficiently responding to nonsurgical treatment options.  Patient underwent right medial unicompartmental knee arthroplasty on 12/20/2022.    Today patient is approximately 6 weeks after the above-mentioned procedure.  He states he is doing well.  His pain is well controlled.  He is no longer taking pain medication.  He does endorse pain at night.  This subsides quickly after he starts moving his knee.  He denies any wound healing issues.  He does not have any fevers or chills.  He is working with a physical therapist and is continuing his home exercise regimen.  He completed his DVT prophylaxis.  He is happy with the result so far.  He does not feel ready to return to work as a .    Social:   Occupation:    Living situation: lives alone, single in home.  Parents are currently in town and are able to help out in postsurgical phase.  Hobbies / Sports: likes fishing/ice fishing    Smoking: No  Alcohol: Yes  Illicit drug use: No         Physical Exam:     EXAMINATION pertinent findings:   PSYCH:  Pleasant, healthy-appearing, alert, oriented x3, cooperative. Normal mood and affect.  VITAL SIGNS: There were no vitals taken for this visit.  Reviewed nursing intake notes.   There is no height or weight on file to calculate BMI.  RESP: non labored breathing   ABD: benign, soft, non-tender, no acute peritoneal findings  SKIN: grossly normal   LYMPHATIC: grossly normal, no adenopathy, no extremity edema  NEURO: grossly normal , no motor deficits  VASCULAR: satisfactory perfusion of all extremities   MUSCULOSKELETAL:   Alignment: Neutral alignment of right lower extremity.      R knee: Incision is clean, dry and intact.  There is some redness around the distal part of the incision.  No fluctuating swelling.  -0-0  .  Straight leg raise +. Effusion minimal. Ligamentously stable in both ML and AP direction.   Normal PF tracking without crepitus.  No signs of DVT.    Right LE:   Thigh and leg compartments soft and compressible   +Quad/TA/GSC/FHL/EHL   SILT DP/SP/Marilou/Saph/Tib nerve distributions   Palpable dorsalis pedis pulse          Data:   All laboratory data reviewed  All imaging studies reviewed by me personally.    XR knee right 1/27/2023:  My interpretation: Status post placement of right medial unicompartmental knee arthroplasty.  Adequate sizing, orientation and fixation of components.  No signs of immediate postoperative complications.             Assessment and Plan:   Assessment:  52-year-old male presenting with chronic right knee pain due to end-stage osteoarthritic changes in medial compartment.  Insufficiently responding to nonsurgical treatment options.  Patient underwent right medial unicompartmental knee arthroplasty on 12/20/2022.  Some redness around distal part of the incision.     Plan:  I extensively discussed my findings with the patient.  Patient appears to be recovering appropriately.  Out of an abundance of caution I prescribed patient a 7-day course of Keflex to combat the residual  redness distal incision.  Patient will continue to work with physical therapy on range of motion, strengthening and gait training.    All questions were answered.  Patient understands and agrees to the treatment plan as set forth.  We will follow-up with patient at the 1 year postoperative date with renewed radiographic imaging studies.      Shabbir Isabel MD, PhD     Adult Reconstruction  [facility] Department of Orthopaedic Surgery          Again, thank you for allowing me to participate in the care of your patient.        Sincerely,        Shabbir Isabel MD

## 2023-01-27 NOTE — LETTER
January 27, 2023      Gibran Conner  [address] [address]        To Whom It May Concern:    Gibran GREGORIO Ubaldo  was seen on 1/27/23.  Please excuse him  until 2/24/23 due to surgery.        Sincerely,        Shabbir Isabel MD

## 2023-01-30 ENCOUNTER — TELEPHONE (OUTPATIENT)
Dept: ORTHOPEDICS | Facility: CLINIC | Age: 53
End: 2023-01-30
Payer: COMMERCIAL

## 2023-01-30 NOTE — TELEPHONE ENCOUNTER
Reason for Call:  Form, our goal is to have forms completed with 7 days, however, some forms may require a visit or additional information.    Type of letter, form or note:  FMLA     Who is the form from?: Patient    Where did the form come from: form was faxed in    Phone number of person requesting form: [place]- [phone]    Desired completion date of form: ASAP      How will form be returned?: unknown    Additional comments: patient is s/p right unicompartment knee replacement with Dr. Isabel, DOS 12/20/22    Form was started and place in accordion folder for provider review/ completion at [facility].

## 2023-01-31 NOTE — TELEPHONE ENCOUNTER
Form completed and faxed to [employer] at number requested. Copy of forms placed in mail to be sent to Employee Benefits Department at address below.     Copy of forms placed in scan basket at [place], originals placed in forms accordion folder at [place] for reference in order to complete additional paperwork requested.     Left VM for patient indicating this set of forms has been completed and sent to requested parties.     Khushi Nolen MSA, ATC  Certified Athletic Trainer

## 2023-02-14 ENCOUNTER — THERAPY VISIT (OUTPATIENT)
Dept: PHYSICAL THERAPY | Facility: CLINIC | Age: 53
End: 2023-02-14
Payer: COMMERCIAL

## 2023-02-14 DIAGNOSIS — Z96.651 AFTERCARE FOLLOWING RIGHT KNEE JOINT REPLACEMENT SURGERY: ICD-10-CM

## 2023-02-14 DIAGNOSIS — Z47.1 AFTERCARE FOLLOWING RIGHT KNEE JOINT REPLACEMENT SURGERY: ICD-10-CM

## 2023-02-14 DIAGNOSIS — G89.29 CHRONIC PAIN OF RIGHT KNEE: Primary | ICD-10-CM

## 2023-02-14 DIAGNOSIS — M25.561 CHRONIC PAIN OF RIGHT KNEE: Primary | ICD-10-CM

## 2023-02-14 PROCEDURE — 97110 THERAPEUTIC EXERCISES: CPT | Mod: GP | Performed by: PHYSICAL THERAPIST

## 2023-02-14 PROCEDURE — 97112 NEUROMUSCULAR REEDUCATION: CPT | Mod: GP | Performed by: PHYSICAL THERAPIST

## 2023-02-14 ASSESSMENT — ACTIVITIES OF DAILY LIVING (ADL)
AS_A_RESULT_OF_YOUR_KNEE_INJURY,_HOW_WOULD_YOU_RATE_YOUR_CURRENT_LEVEL_OF_DAILY_ACTIVITY?: NEARLY NORMAL
HOW_WOULD_YOU_RATE_THE_CURRENT_FUNCTION_OF_YOUR_KNEE_DURING_YOUR_USUAL_DAILY_ACTIVITIES_ON_A_SCALE_FROM_0_TO_100_WITH_100_BEING_YOUR_LEVEL_OF_KNEE_FUNCTION_PRIOR_TO_YOUR_INJURY_AND_0_BEING_THE_INABILITY_TO_PERFORM_ANY_OF_YOUR_USUAL_DAILY_ACTIVITIES?: 70
RAW_SCORE: 54
SWELLING: I DO NOT HAVE THE SYMPTOM
STIFFNESS: I HAVE THE SYMPTOM BUT IT DOES NOT AFFECT MY ACTIVITY
GO DOWN STAIRS: ACTIVITY IS SOMEWHAT DIFFICULT
GIVING WAY, BUCKLING OR SHIFTING OF KNEE: I HAVE THE SYMPTOM BUT IT DOES NOT AFFECT MY ACTIVITY
SIT WITH YOUR KNEE BENT: ACTIVITY IS NOT DIFFICULT
KNEE_ACTIVITY_OF_DAILY_LIVING_SUM: 54
WEAKNESS: I HAVE THE SYMPTOM BUT IT DOES NOT AFFECT MY ACTIVITY
HOW_WOULD_YOU_RATE_THE_OVERALL_FUNCTION_OF_YOUR_KNEE_DURING_YOUR_USUAL_DAILY_ACTIVITIES?: NEARLY NORMAL
STAND: ACTIVITY IS NOT DIFFICULT
GO UP STAIRS: ACTIVITY IS SOMEWHAT DIFFICULT
PAIN: I HAVE THE SYMPTOM BUT IT DOES NOT AFFECT MY ACTIVITY
RISE FROM A CHAIR: ACTIVITY IS MINIMALLY DIFFICULT
KNEEL ON THE FRONT OF YOUR KNEE: ACTIVITY IS VERY DIFFICULT
SQUAT: ACTIVITY IS MINIMALLY DIFFICULT
KNEE_ACTIVITY_OF_DAILY_LIVING_SCORE: 77.14
WALK: ACTIVITY IS MINIMALLY DIFFICULT
LIMPING: I HAVE THE SYMPTOM BUT IT DOES NOT AFFECT MY ACTIVITY

## 2023-02-15 NOTE — TELEPHONE ENCOUNTER
Called and spoke to patient regarding return to work.   Patient states he anticipates returning to work on 2/27/23, and needs to speak with HR regarding his return. He reports he has been working with Physical Therapy and able to ambulate stairs more easily.     Writer updated form to include return to work date of 2/27/23. Forms were faxed to Yoselin Franks- HR Rep at [phone].   -Writer noted hesitation regarding kneeling tasks and working on all fours.     Writer placed copy of form in outgoing mail per patient request, and form sent to scan.     Maddi Lyon, ATC

## 2023-02-20 ENCOUNTER — TELEPHONE (OUTPATIENT)
Dept: ORTHOPEDICS | Facility: CLINIC | Age: 53
End: 2023-02-20
Payer: COMMERCIAL

## 2023-02-20 NOTE — TELEPHONE ENCOUNTER
Writer called patient on 2/15/23 regarding return to work status.   He reports hesitation with kneeling, and prolonged kneeling repairs. He asked verbiage to be included on forms regarding limited kneeling as a precaution.     Letter drafted and faxed to Yoselin.   Confirmed via [employer].     Maddi Lyon, ATC

## 2023-02-20 NOTE — TELEPHONE ENCOUNTER
[facility] Call Center    Phone Message    May a detailed message be left on voicemail: yes     Reason for Call: Other: Work is wondering if Gibran will have any restriction when he returns to work on 2/27/2023 if so can you please do a letter with his restrictions and fax to his employer [employer] [phone]     Action Taken: Other: [facility]    Travel Screening: Not Applicable

## 2023-02-20 NOTE — LETTER
[facility] [place]  [address]  [address]  [place] [address]  [phone]          February 20, 2023    RE:  Gibran Conner                                                                                                                                                       [address] [place] [id number]            To whom it may concern:    Gibran Conner is under my professional care for his right knee. He  may return to work with the following: The employee is ABLE to return to work 2/27/23    When the patient returns to work, the following restrictions apply:  A)  Kneel as tolerated, may require assistance for tasks demanding prolonged kneeling or positioning on all fours.            Sincerely,        Shabbir Isabel MD

## 2023-02-21 ENCOUNTER — TELEPHONE (OUTPATIENT)
Dept: ORTHOPEDICS | Facility: CLINIC | Age: 53
End: 2023-02-21
Payer: COMMERCIAL

## 2023-02-21 NOTE — LETTER
[facility] [place]  [address]  [address]  [place] [address]  [phone]          February 22, 2023    RE:  Gibran Conner                                                                                                                                                       [address] [facility] [address] [address]            To whom it may concern:    Gibran Conner is under my professional care for his right knee. He  may return to work with the following: The employee is ABLE to return to work 2/27/23    When the patient returns to work, the following restrictions apply:  A)  Kneel as tolerated, may require assistance for tasks demanding prolonged kneeling or positioning on all fours.     Restriction/ accommodations in place x3 months, until 5/22/23. He will notify our office of any concerns  as needed.            Sincerely,        Shabbir Isabel MD

## 2023-02-21 NOTE — TELEPHONE ENCOUNTER
[facility] Call Center    Phone Message    May a detailed message be left on voicemail: yes     Reason for Call: Form or Letter   Type or form/letter needing completion: Patient's employer needs the length of his kneel restrictions.  Provider: Shabbir Isabel  Date form needed: asap  Once completed: Fax to Yoseiln Franks [phone]        Action Taken: Message routed to:  [facility] ([facility] Orthopedics    Travel Screening: Not Applicable

## 2023-02-22 NOTE — TELEPHONE ENCOUNTER
Call to patient to discuss length of accomodation.   Patient unsure of how long his knee will be sore with kneeling.    Writer and patient discussed plan for accomodation x3 months, he will update staff of any concerns with progress, or if changes need to be made.     Letter faxed.     Maddi Lyon, ANA[facility]

## 2023-03-28 PROBLEM — Z47.1 AFTERCARE FOLLOWING RIGHT KNEE JOINT REPLACEMENT SURGERY: Status: RESOLVED | Noted: 2022-12-26 | Resolved: 2023-03-28

## 2023-03-28 PROBLEM — Z96.651 AFTERCARE FOLLOWING RIGHT KNEE JOINT REPLACEMENT SURGERY: Status: RESOLVED | Noted: 2022-12-26 | Resolved: 2023-03-28

## 2023-03-28 PROBLEM — M25.561 CHRONIC PAIN OF RIGHT KNEE: Status: RESOLVED | Noted: 2022-12-26 | Resolved: 2023-03-28

## 2023-03-28 PROBLEM — G89.29 CHRONIC PAIN OF RIGHT KNEE: Status: RESOLVED | Noted: 2022-12-26 | Resolved: 2023-03-28

## 2023-03-28 NOTE — PROGRESS NOTES
DISCHARGE SUMMARY    Gibran Conner was seen 5 times for evaluation and treatment.  Patient did not return for further treatment and current status is unknown.  Due to short treatment duration, no objective or functional changes were made.  Please see goal flow sheet from episode noted date below and initial evaluation for further information.  Patient is discharged from therapy and therapy episode is resolved as of 03/28/23.      Linked Episodes   Type: Episode: Status: Noted: Resolved: Last update: Updated by:   PHYSICAL THERAPY Raúl 12/26/22 Active 12/26/2022 2/14/2023 10:13 AM Joshua Sanches, PT      Comments:

## 2023-06-20 NOTE — TELEPHONE ENCOUNTER
Addended by: LUCIA AVALOS on: 6/20/2023 02:34 PM     Modules accepted: Orders     PA approved for Synvisc One right knee.    Tania Guzman MBA, ATC

## 2023-09-30 ENCOUNTER — HEALTH MAINTENANCE LETTER (OUTPATIENT)
Age: 53
End: 2023-09-30

## 2024-01-01 ENCOUNTER — LAB REQUISITION (OUTPATIENT)
Dept: LAB | Facility: CLINIC | Age: 54
End: 2024-01-01

## 2024-01-01 PROCEDURE — 88341 IMHCHEM/IMCYTCHM EA ADD ANTB: CPT | Mod: TC | Performed by: DENTIST

## 2024-08-06 ENCOUNTER — TELEPHONE (OUTPATIENT)
Dept: ORTHOPEDICS | Facility: CLINIC | Age: 54
End: 2024-08-06
Payer: COMMERCIAL

## 2024-08-06 NOTE — TELEPHONE ENCOUNTER
Patient Returning Call    Reason for call:  patient calling having questions about the clunking he hears as of two weeks ago from surgery that provider did two years ago, requesting callback     Information relayed to patient:  te sent to clinic     Patient has additional questions:  No      [facility] or would you prefer to receive a phone call?:   Patient would prefer a phone call   Okay to leave a detailed message?: Yes at Cell number on file:    Telephone Information:   Mobile [phone]

## 2024-08-07 NOTE — TELEPHONE ENCOUNTER
"Phoned patient back regarding right knee \"clunking\" symptoms.    Patient is s/p right uni knee replacement 12/20/22 with Dr. Isabel.    Patient reports there is an audible clunking in his right knee that started about 2 weeks ago.  He denies recent trauma/falls, knee pain, decreased ROM, inability to bear weight, swelling, redness, weeping/drainage, and/or fever/chills.      Patient agreeable to follow up with Dr. Isabel in clinic for further evaluation.  Writer assisted in scheduling patient at the [place] location.  Patient thankful for call and has no further questions.    Alisha Vela RN on 8/7/2024 at 10:07 AM    "

## 2024-08-23 ENCOUNTER — OFFICE VISIT (OUTPATIENT)
Dept: ORTHOPEDICS | Facility: CLINIC | Age: 54
End: 2024-08-23
Payer: COMMERCIAL

## 2024-08-23 ENCOUNTER — ANCILLARY PROCEDURE (OUTPATIENT)
Dept: GENERAL RADIOLOGY | Facility: CLINIC | Age: 54
End: 2024-08-23
Attending: STUDENT IN AN ORGANIZED HEALTH CARE EDUCATION/TRAINING PROGRAM
Payer: COMMERCIAL

## 2024-08-23 VITALS — DIASTOLIC BLOOD PRESSURE: 86 MMHG | SYSTOLIC BLOOD PRESSURE: 129 MMHG

## 2024-08-23 DIAGNOSIS — Z47.89 ORTHOPEDIC AFTERCARE: Primary | ICD-10-CM

## 2024-08-23 DIAGNOSIS — Z47.89 ORTHOPEDIC AFTERCARE: ICD-10-CM

## 2024-08-23 DIAGNOSIS — Z96.651 S/P RIGHT UNICOMPARTMENTAL KNEE REPLACEMENT: ICD-10-CM

## 2024-08-23 PROCEDURE — 99213 OFFICE O/P EST LOW 20 MIN: CPT | Performed by: STUDENT IN AN ORGANIZED HEALTH CARE EDUCATION/TRAINING PROGRAM

## 2024-08-23 PROCEDURE — 73562 X-RAY EXAM OF KNEE 3: CPT | Mod: TC | Performed by: RADIOLOGY

## 2024-08-23 ASSESSMENT — KOOS JR: KOOS JR SCORING: 100

## 2024-08-23 NOTE — PROGRESS NOTES
[facility]  Orthopaedic Surgery Consultation by Shabbir Isabel M.D.    Gibran Conner MRN# [id number]   Age: 52 year old YOB: 1970     Requesting physician: Matt Wall     Background history:  DX:  Hypertension  Type 2 diabetes mellitus latest A1c 7.2 according to patient    TREATMENTS:  Right knee menisectomy ~20 years ago   12/20/2022, right medial unicompartmental knee arthroplasty, Dr. Isabel           History of Present Illness:     52-year-old male presenting with chronic right knee pain due to end-stage osteoarthritic changes in medial compartment.  Insufficiently responding to nonsurgical treatment options.  Patient underwent right medial unicompartmental knee arthroplasty on 12/20/2022.    Today the patient presents approximately a year and a half status post right medial unicompartmental arthroplasty.  He states that overall he has been very pleased with the surgery and pain-free.  1 month ago he started to feel a clunk in the knee while walking.  This lasted about 2-1/2 weeks before it spontaneously resolved.  He denies any pain or locking associated with the clunk.  He denies any swelling, redness fever or malaise.    Social:   Occupation:    Living situation: lives alone, single in home.  Parents are currently in town and are able to help out in postsurgical phase.  Hobbies / Sports: likes fishing/ice fishing    Smoking: No  Alcohol: Yes  Illicit drug use: No         Physical Exam:     EXAMINATION pertinent findings:   PSYCH: Pleasant, healthy-appearing, alert, oriented x3, cooperative. Normal mood and affect.  VITAL SIGNS: There were no vitals taken for this visit.  Reviewed nursing intake notes.   There is no height or weight on file to calculate BMI.  RESP: non labored breathing   ABD: benign, soft, non-tender, no acute peritoneal findings  SKIN: grossly normal   LYMPHATIC: grossly normal, no adenopathy, no extremity  edema  NEURO: grossly normal , no motor deficits  VASCULAR: satisfactory perfusion of all extremities   MUSCULOSKELETAL:   Alignment: Neutral alignment of right lower extremity.      R knee: Incision is clean, dry and intact.  There is some redness around the distal part of the incision.   -0-0  .  No reproducible clunk with range of motion today straight leg raise +.  No effusion ligamentously stable in both ML and AP direction.   Normal PF tracking without crepitus.  No signs of DVT.    Right LE:   Thigh and leg compartments soft and compressible   +Quad/TA/GSC/FHL/EHL   SILT DP/SP/Marilou/Saph/Tib nerve distributions   Palpable dorsalis pedis pulse          Data:   All laboratory data reviewed  All imaging studies reviewed by me personally.    XR knee right 8/23/2024:  My interpretation: Status post placement of right medial unicompartmental knee arthroplasty.  Adequate sizing, orientation and fixation of components.  No signs of immediate postoperative complications.             Assessment and Plan:   Assessment:  52-year-old male presenting with chronic right knee pain due to end-stage osteoarthritic changes in medial compartment.  Insufficiently responding to nonsurgical treatment options.  Patient underwent right medial unicompartmental knee arthroplasty on 12/20/2022.  Now presenting with spontaneously subsided short-lived episode of clicking right knee of unknown etiology.     Plan:  After examined the patient reviewing the x-rays I extensively discussed my finds with him today.  X-rays show no signs of mechanical failure of the implants.  I was not able to reproduce the clunk today on physical exam.  I cannot accurately tell him where this clunk came from but may be secondary to scar tissue.  If it is not painful and he has no locking or decreased function of the knee we are not overly concerned.  If the clunk in the knee recurs I instructed him to call.  He can continue to activities as tolerated.  I  recommend avoiding high impact exercises long-term.  All questions were answered the patient will follow-up at his 5-year postop.      Shabbir Isabel MD, PhD     Adult Reconstruction  [facility] Department of Orthopaedic Surgery

## 2024-08-23 NOTE — LETTER
8/23/2024      Gibran Conner  [address] [id number]      Dear Colleague,    Thank you for referring your patient, Gibran Conner, to the [facility] [place]. Please see a copy of my visit note below.        [facility]  Orthopaedic Surgery Consultation by Shabbir Isabel M.D.    Gibran Conner MRN# [id number]   Age: 52 year old YOB: 1970     Requesting physician: Matt Wall     Background history:  DX:  Hypertension  Type 2 diabetes mellitus latest A1c 7.2 according to patient    TREATMENTS:  Right knee menisectomy ~20 years ago   12/20/2022, right medial unicompartmental knee arthroplasty, Dr. Isabel           History of Present Illness:     52-year-old male presenting with chronic right knee pain due to end-stage osteoarthritic changes in medial compartment.  Insufficiently responding to nonsurgical treatment options.  Patient underwent right medial unicompartmental knee arthroplasty on 12/20/2022.    Today the patient presents approximately a year and a half status post right medial unicompartmental arthroplasty.  He states that overall he has been very pleased with the surgery and pain-free.  1 month ago he started to feel a clunk in the knee while walking.  This lasted about 2-1/2 weeks before it spontaneously resolved.  He denies any pain or locking associated with the clunk.  He denies any swelling, redness fever or malaise.    Social:   Occupation:    Living situation: lives alone, single in home.  Parents are currently in town and are able to help out in postsurgical phase.  Hobbies / Sports: likes fishing/ice fishing    Smoking: No  Alcohol: Yes  Illicit drug use: No         Physical Exam:     EXAMINATION pertinent findings:   PSYCH: Pleasant, healthy-appearing, alert, oriented x3, cooperative. Normal mood and affect.  VITAL SIGNS: There were no vitals taken for this visit.  Reviewed  nursing intake notes.   There is no height or weight on file to calculate BMI.  RESP: non labored breathing   ABD: benign, soft, non-tender, no acute peritoneal findings  SKIN: grossly normal   LYMPHATIC: grossly normal, no adenopathy, no extremity edema  NEURO: grossly normal , no motor deficits  VASCULAR: satisfactory perfusion of all extremities   MUSCULOSKELETAL:   Alignment: Neutral alignment of right lower extremity.      R knee: Incision is clean, dry and intact.  There is some redness around the distal part of the incision.   -0-0  .  No reproducible clunk with range of motion today straight leg raise +.  No effusion ligamentously stable in both ML and AP direction.   Normal PF tracking without crepitus.  No signs of DVT.    Right LE:   Thigh and leg compartments soft and compressible   +Quad/TA/GSC/FHL/EHL   SILT DP/SP/Marilou/Saph/Tib nerve distributions   Palpable dorsalis pedis pulse          Data:   All laboratory data reviewed  All imaging studies reviewed by me personally.    XR knee right 8/23/2024:  My interpretation: Status post placement of right medial unicompartmental knee arthroplasty.  Adequate sizing, orientation and fixation of components.  No signs of immediate postoperative complications.             Assessment and Plan:   Assessment:  52-year-old male presenting with chronic right knee pain due to end-stage osteoarthritic changes in medial compartment.  Insufficiently responding to nonsurgical treatment options.  Patient underwent right medial unicompartmental knee arthroplasty on 12/20/2022.  Now presenting with spontaneously subsided short-lived episode of clicking right knee of unknown etiology.     Plan:  After examined the patient reviewing the x-rays I extensively discussed my finds with him today.  X-rays show no signs of mechanical failure of the implants.  I was not able to reproduce the clunk today on physical exam.  I cannot accurately tell him where this clunk came from but  may be secondary to scar tissue.  If it is not painful and he has no locking or decreased function of the knee we are not overly concerned.  If the clunk in the knee recurs I instructed him to call.  He can continue to activities as tolerated.  I recommend avoiding high impact exercises long-term.  All questions were answered the patient will follow-up at his 5-year postop.      Shabbir Isabel MD, PhD     Adult Reconstruction  [facility] Department of Orthopaedic Surgery      Again, thank you for allowing me to participate in the care of your patient.        Sincerely,        Shabbir Isabel MD

## 2024-11-23 ENCOUNTER — HEALTH MAINTENANCE LETTER (OUTPATIENT)
Age: 54
End: 2024-11-23

## (undated) DEVICE — DRSG GAUZE 4X4" TRAY

## (undated) DEVICE — BLADE OXFORD PARTIAL KNEE STAB

## (undated) DEVICE — GOWN XLG DISP 9545

## (undated) DEVICE — SU VICRYL 3-0 PS-2 27" UND J427H

## (undated) DEVICE — SU VICRYL 0 CT-2 CR 8X18" J727D

## (undated) DEVICE — BNDG ELASTIC 6" DBL LENGTH UNSTERILE 6611-16

## (undated) DEVICE — Device

## (undated) DEVICE — HOOD FLYTE W/PEELAWAY 408-800-100

## (undated) DEVICE — KIT SUSPENSION SHOULDER 72200195

## (undated) DEVICE — DRSG STERI STRIP 1/2X4" R1547

## (undated) DEVICE — SU VICRYL 2-0 CT-2 27" UND J269H

## (undated) DEVICE — LINEN ORTHO ACL PACK 5447

## (undated) DEVICE — SU VICRYL 1 MO-4 18" J702D

## (undated) DEVICE — GLOVE BIOGEL PI SZ 7.5 40875

## (undated) DEVICE — TOURNIQUET SGL BLADDER 34"X4" PURPLE 5921-034-135

## (undated) DEVICE — GLOVE PROTEXIS POWDER FREE SMT 7.5  2D72PT75X

## (undated) DEVICE — COVER FOOTSWITCH W/CINCH 20X24" 923267

## (undated) DEVICE — BLADE KNIFE SURG 10 371110

## (undated) DEVICE — SPONGE LAP 18X18" X8435

## (undated) DEVICE — SOL NACL 0.9% IRRIG 1000ML BOTTLE 2F7124

## (undated) DEVICE — SU MONOCRYL 3-0 PS-2 27" Y427H

## (undated) DEVICE — SOL NACL 0.9% IRRIG 3000ML BAG 2B7477

## (undated) DEVICE — ARTHROSCOPIC CANNULA 7MMX7CM PURPLE  AR-6550

## (undated) DEVICE — SU VICRYL 2-0 CT-1 27" UND J259H

## (undated) DEVICE — SUCTION MANIFOLD NEPTUNE 2 SYS 4 PORT 0702-020-000

## (undated) DEVICE — FIRSTPASS NEEDLE AND SUTURE CAPTURE

## (undated) DEVICE — DISPOSABLE KIT FOR 2.8MM Q-FIX SUTURE ANCHOR

## (undated) DEVICE — SUCTION TIP YANKAUER W/O VENT K86

## (undated) DEVICE — BLADE SHAVER ARTHRO 4.2MM CUDA C9254

## (undated) DEVICE — CAST PADDING 6" STERILE 9046S

## (undated) DEVICE — PACK SHOULDER RIDGES

## (undated) DEVICE — GLOVE PROTEXIS BLUE W/NEU-THERA 7.5  2D73EB75

## (undated) DEVICE — LINEN FULL SHEET 5511

## (undated) DEVICE — DRAPE IOBAN INCISE 23X17" 6650EZ

## (undated) DEVICE — LINEN HALF SHEET 5512

## (undated) DEVICE — BUR SHAVER ARTHRO 6MM OVAL H9102

## (undated) DEVICE — TUBING ARTHRO CONMED/LINVATEC PUMP BLUE INFLOW 10K100

## (undated) DEVICE — BAG CLEAR TRASH 1.3M 39X33" P4040C

## (undated) DEVICE — DRSG AQUACEL AG HYDROFIBER  3.5X10" 422605

## (undated) DEVICE — DRAPE U-POUCH 34X29" 1067

## (undated) DEVICE — GLOVE PROTEXIS POWDER FREE 7.5 ORTHOPEDIC 2D73ET75

## (undated) DEVICE — GLOVE BIOGEL PI MICRO INDICATOR UNDERGLOVE SZ 8.0 48980

## (undated) DEVICE — SU VICRYL 0 MO-4 CR 18" J701D

## (undated) DEVICE — PACK TOTAL KNEE BOXED LATEX FREE PO15TKFCT

## (undated) DEVICE — ESU PENCIL W/HOLSTER E2350H

## (undated) DEVICE — ESU GROUND PAD ADULT W/CORD E7507

## (undated) DEVICE — BONE CEMENT MIXEVAC III HI VAC KIT  0206-015-000

## (undated) DEVICE — SET HANDPIECE INTERPULSE W/COAXIAL FAN SPRAY TIP 0210118000

## (undated) DEVICE — GLOVE PROTEXIS BLUE W/NEU-THERA 7.0  2D73EB70

## (undated) DEVICE — DRAPE SHOULDER PACK SPLITS 29365

## (undated) DEVICE — DRSG ADAPTIC 3X8" 6113

## (undated) DEVICE — BLADE KNIFE SURG 11 371111

## (undated) DEVICE — DRSG ABDOMINAL 07 1/2X8" 7197D

## (undated) DEVICE — DRAPE STERI U 1015

## (undated) DEVICE — SU FIBERWIRE 2 38"  AR-7200

## (undated) DEVICE — GLOVE BIOGEL PI ULTRATOUCH SZ 7.5 41175

## (undated) DEVICE — ESU ARTHROWAND 90DEG RF AMBIENT SUPER TURBOVAC ASHA4250-01

## (undated) RX ORDER — GLYCOPYRROLATE 0.2 MG/ML
INJECTION INTRAMUSCULAR; INTRAVENOUS
Status: DISPENSED
Start: 2022-12-20

## (undated) RX ORDER — OXYCODONE HYDROCHLORIDE 5 MG/1
TABLET ORAL
Status: DISPENSED
Start: 2022-12-20

## (undated) RX ORDER — BUPIVACAINE HYDROCHLORIDE 2.5 MG/ML
INJECTION, SOLUTION EPIDURAL; INFILTRATION; INTRACAUDAL
Status: DISPENSED
Start: 2018-08-14

## (undated) RX ORDER — KETOROLAC TROMETHAMINE 30 MG/ML
INJECTION, SOLUTION INTRAMUSCULAR; INTRAVENOUS
Status: DISPENSED
Start: 2022-12-20

## (undated) RX ORDER — ONDANSETRON 2 MG/ML
INJECTION INTRAMUSCULAR; INTRAVENOUS
Status: DISPENSED
Start: 2022-12-20

## (undated) RX ORDER — HYDRALAZINE HYDROCHLORIDE 20 MG/ML
INJECTION INTRAMUSCULAR; INTRAVENOUS
Status: DISPENSED
Start: 2018-08-14

## (undated) RX ORDER — NEOSTIGMINE METHYLSULFATE 1 MG/ML
VIAL (ML) INJECTION
Status: DISPENSED
Start: 2022-12-20

## (undated) RX ORDER — METHADONE HYDROCHLORIDE 10 MG/ML
INJECTION, SOLUTION INTRAMUSCULAR; INTRAVENOUS; SUBCUTANEOUS
Status: DISPENSED
Start: 2022-12-20

## (undated) RX ORDER — FENTANYL CITRATE 50 UG/ML
INJECTION, SOLUTION INTRAMUSCULAR; INTRAVENOUS
Status: DISPENSED
Start: 2018-08-14

## (undated) RX ORDER — CEFAZOLIN SODIUM 1 G/50ML
SOLUTION INTRAVENOUS
Status: DISPENSED
Start: 2018-08-14

## (undated) RX ORDER — PROPOFOL 10 MG/ML
INJECTION, EMULSION INTRAVENOUS
Status: DISPENSED
Start: 2022-12-20

## (undated) RX ORDER — ONDANSETRON 2 MG/ML
INJECTION INTRAMUSCULAR; INTRAVENOUS
Status: DISPENSED
Start: 2018-08-14

## (undated) RX ORDER — CEFAZOLIN SODIUM 1 G/3ML
INJECTION, POWDER, FOR SOLUTION INTRAMUSCULAR; INTRAVENOUS
Status: DISPENSED
Start: 2018-08-14

## (undated) RX ORDER — GLYCOPYRROLATE 0.2 MG/ML
INJECTION INTRAMUSCULAR; INTRAVENOUS
Status: DISPENSED
Start: 2018-08-14

## (undated) RX ORDER — DEXAMETHASONE SODIUM PHOSPHATE 4 MG/ML
INJECTION, SOLUTION INTRA-ARTICULAR; INTRALESIONAL; INTRAMUSCULAR; INTRAVENOUS; SOFT TISSUE
Status: DISPENSED
Start: 2018-08-14

## (undated) RX ORDER — LIDOCAINE HYDROCHLORIDE 10 MG/ML
INJECTION, SOLUTION EPIDURAL; INFILTRATION; INTRACAUDAL; PERINEURAL
Status: DISPENSED
Start: 2022-12-20

## (undated) RX ORDER — DEXAMETHASONE SODIUM PHOSPHATE 4 MG/ML
INJECTION, SOLUTION INTRA-ARTICULAR; INTRALESIONAL; INTRAMUSCULAR; INTRAVENOUS; SOFT TISSUE
Status: DISPENSED
Start: 2022-12-20

## (undated) RX ORDER — DEXMEDETOMIDINE HYDROCHLORIDE 4 UG/ML
INJECTION, SOLUTION INTRAVENOUS
Status: DISPENSED
Start: 2022-12-20

## (undated) RX ORDER — LIDOCAINE HYDROCHLORIDE 10 MG/ML
INJECTION, SOLUTION EPIDURAL; INFILTRATION; INTRACAUDAL; PERINEURAL
Status: DISPENSED
Start: 2018-08-14

## (undated) RX ORDER — PROPOFOL 10 MG/ML
INJECTION, EMULSION INTRAVENOUS
Status: DISPENSED
Start: 2018-08-14

## (undated) RX ORDER — ACETAMINOPHEN 325 MG/1
TABLET ORAL
Status: DISPENSED
Start: 2022-12-20

## (undated) RX ORDER — TRANEXAMIC ACID 650 MG/1
TABLET ORAL
Status: DISPENSED
Start: 2022-12-20

## (undated) RX ORDER — CEFAZOLIN SODIUM 2 G/100ML
INJECTION, SOLUTION INTRAVENOUS
Status: DISPENSED
Start: 2018-08-14